# Patient Record
Sex: MALE | Race: WHITE | NOT HISPANIC OR LATINO | Employment: FULL TIME | URBAN - NONMETROPOLITAN AREA
[De-identification: names, ages, dates, MRNs, and addresses within clinical notes are randomized per-mention and may not be internally consistent; named-entity substitution may affect disease eponyms.]

---

## 2017-01-06 ENCOUNTER — OFFICE VISIT (OUTPATIENT)
Dept: INTERNAL MEDICINE | Facility: CLINIC | Age: 57
End: 2017-01-06

## 2017-01-06 VITALS
OXYGEN SATURATION: 97 % | SYSTOLIC BLOOD PRESSURE: 130 MMHG | HEIGHT: 69 IN | HEART RATE: 84 BPM | DIASTOLIC BLOOD PRESSURE: 80 MMHG | WEIGHT: 275 LBS | BODY MASS INDEX: 40.73 KG/M2

## 2017-01-06 DIAGNOSIS — E55.9 VITAMIN D DEFICIENCY: Primary | ICD-10-CM

## 2017-01-06 PROCEDURE — 99212 OFFICE O/P EST SF 10 MIN: CPT | Performed by: FAMILY MEDICINE

## 2017-01-06 RX ORDER — ERGOCALCIFEROL 1.25 MG/1
50000 CAPSULE ORAL WEEKLY
Qty: 12 CAPSULE | Refills: 3 | Status: SHIPPED | OUTPATIENT
Start: 2017-01-06 | End: 2021-08-13

## 2017-01-06 NOTE — PROGRESS NOTES
"Subjective   London Ag is a 56 y.o. male.     History of Present Illness   London presents today to go over labs that were done last month. There are no issues that I see at this time.  VSS. NAD.  Hist AST was a bit high at 45. We will watch that for now. Likely fatty liver.  Hepatitis panel was negative.  His vitamin D levels were low at 24.      The following portions of the patient's history were reviewed and updated as appropriate: allergies, current medications, past social history and problem list.    Review of Systems   Constitutional: Negative for appetite change, chills, fatigue, fever and unexpected weight change.   HENT: Negative for congestion, ear pain, hearing loss, nosebleeds, postnasal drip, rhinorrhea, sore throat, tinnitus and trouble swallowing.    Eyes: Negative for photophobia, discharge and visual disturbance.   Respiratory: Negative for cough, chest tightness, shortness of breath and wheezing.    Cardiovascular: Negative for chest pain, palpitations and leg swelling.   Gastrointestinal: Negative for abdominal distention, abdominal pain, blood in stool, constipation, diarrhea, nausea and vomiting.   Endocrine: Negative for cold intolerance, heat intolerance, polydipsia, polyphagia and polyuria.   Musculoskeletal: Negative for arthralgias, back pain, joint swelling, myalgias, neck pain and neck stiffness.   Skin: Negative for color change, pallor, rash and wound.   Allergic/Immunologic: Negative for environmental allergies, food allergies and immunocompromised state.   Neurological: Negative for dizziness, tremors, seizures, weakness, numbness and headaches.   Hematological: Negative for adenopathy. Does not bruise/bleed easily.   Psychiatric/Behavioral: Negative for agitation, behavioral problems, confusion, hallucinations, self-injury and suicidal ideas. The patient is not nervous/anxious.        Objective   Visit Vitals   • /80   • Pulse 84   • Ht 69\" (175.3 cm)   • Wt 275 lb " (125 kg)   • SpO2 97%   • BMI 40.61 kg/m2     Physical Exam   Constitutional: He is oriented to person, place, and time. He appears well-developed and well-nourished. No distress.   HENT:   Head: Normocephalic and atraumatic.   Right Ear: External ear normal.   Left Ear: External ear normal.   Nose: Nose normal.   Mouth/Throat: Oropharynx is clear and moist.   Eyes: Conjunctivae and EOM are normal. Pupils are equal, round, and reactive to light. Right eye exhibits no discharge. Left eye exhibits no discharge. No scleral icterus.   Neck: Normal range of motion. Neck supple. No JVD present. No tracheal deviation present. No thyromegaly present.   Cardiovascular: Normal rate, regular rhythm, normal heart sounds and intact distal pulses.  Exam reveals no gallop and no friction rub.    No murmur heard.  Pulmonary/Chest: Effort normal and breath sounds normal. No stridor. No respiratory distress. He has no wheezes. He has no rales. He exhibits no tenderness.   Abdominal: Soft. Bowel sounds are normal. He exhibits no distension and no mass. There is no tenderness. There is no rebound and no guarding. No hernia.   Musculoskeletal: Normal range of motion. He exhibits no edema, tenderness or deformity.   Lymphadenopathy:     He has no cervical adenopathy.   Neurological: He is alert and oriented to person, place, and time. He has normal reflexes. He displays normal reflexes. No cranial nerve deficit. He exhibits normal muscle tone.   Skin: Skin is warm and dry. No rash noted. No erythema. No pallor.   Psychiatric: He has a normal mood and affect. His behavior is normal. Judgment normal.       Assessment/Plan   Problem List Items Addressed This Visit        Digestive    Vitamin D deficiency - Primary    Relevant Medications    vitamin D (ERGOCALCIFEROL) 97032 UNITS capsule capsule

## 2017-01-06 NOTE — MR AVS SNAPSHOT
London Ag   1/6/2017 10:00 AM   Office Visit    Provider:  Osmin Day DO   Department:  [facility] PRIMARY CARE   Dept Phone:  [phone]                Your Full Care Plan              Today's Medication Changes          These changes are accurate as of: 1/6/17 10:43 AM.  If you have any questions, ask your nurse or doctor.               New Medication(s)Ordered:     vitamin D 27674 UNITS capsule capsule   Commonly known as:  ERGOCALCIFEROL   Take 1 capsule by mouth 1 (One) Time Per Week.   Started by:  Osmin Day DO            Where to Get Your Medications      These medications were sent to [id number] - [place], [place] - [address] [place] [address] [phone]  - [phone] [address] [address]     Phone:  [phone]     vitamin D 21215 UNITS capsule capsule                  Your Updated Medication List          This list is accurate as of: 1/6/17 10:43 AM.  Always use your most recent med list.                vitamin D 31026 UNITS capsule capsule   Commonly known as:  ERGOCALCIFEROL   Take 1 capsule by mouth 1 (One) Time Per Week.               You Were Diagnosed With        Codes Comments    Vitamin D deficiency    -  Primary ICD-10-CM: E55.9  ICD-9-CM: 268.9       Instructions     None    Patient Instructions History      CellARidehart Signup     Our records indicate that you have an active [employer] account.    You can view your After Visit Summary by going to [employer] and logging in with your Jamgo username and password.  If you don't have a Jamgo username and password but a parent or guardian has access to your record, the parent or guardian should login with their own Jamgo username and password and access your record to view the After Visit Summary.    If you have questions, you can email [email or web address] or call [phone] to talk to our Jamgo staff.  Remember, Jamgo is NOT to  "be used for urgent needs.  For medical emergencies, dial 911.               Other Info from Your Visit           Your Appointments     Jul 07, 2017  9:30 AM EDT   Follow Up with Osmin Day DO   [facility] PRIMARY CARE (--)    [address] [place] [address] [address]  [facility] [address] [phone]           Arrive 15 minutes prior to appointment.              Allergies     No Known Allergies      Reason for Visit     Labs Only FOLLOW UP ON ANY ABNORMAL LABS      Vital Signs     Blood Pressure Pulse Height Weight Oxygen Saturation Body Mass Index    130/80 84 69\" (175.3 cm) 275 lb (125 kg) 97% 40.61 kg/m2    Smoking Status                   Former Smoker           Problems and Diagnoses Noted     Vitamin D deficiency      "

## 2021-08-13 ENCOUNTER — OFFICE VISIT (OUTPATIENT)
Dept: INTERNAL MEDICINE | Facility: CLINIC | Age: 61
End: 2021-08-13

## 2021-08-13 VITALS
SYSTOLIC BLOOD PRESSURE: 140 MMHG | WEIGHT: 273.4 LBS | HEART RATE: 90 BPM | OXYGEN SATURATION: 98 % | HEIGHT: 68 IN | DIASTOLIC BLOOD PRESSURE: 86 MMHG | TEMPERATURE: 97.1 F | BODY MASS INDEX: 41.43 KG/M2

## 2021-08-13 DIAGNOSIS — Z76.89 ENCOUNTER TO ESTABLISH CARE: Primary | ICD-10-CM

## 2021-08-13 DIAGNOSIS — E66.01 MORBID OBESITY (HCC): ICD-10-CM

## 2021-08-13 DIAGNOSIS — Z13.0 SCREENING FOR DISORDER OF BLOOD AND BLOOD-FORMING ORGANS: ICD-10-CM

## 2021-08-13 DIAGNOSIS — R03.0 ELEVATED BLOOD PRESSURE READING: ICD-10-CM

## 2021-08-13 DIAGNOSIS — Z13.0 SCREENING FOR ENDOCRINE, METABOLIC AND IMMUNITY DISORDER: ICD-10-CM

## 2021-08-13 DIAGNOSIS — E55.9 VITAMIN D DEFICIENCY: ICD-10-CM

## 2021-08-13 DIAGNOSIS — Z13.228 SCREENING FOR ENDOCRINE, METABOLIC AND IMMUNITY DISORDER: ICD-10-CM

## 2021-08-13 DIAGNOSIS — Z13.29 SCREENING FOR ENDOCRINE, METABOLIC AND IMMUNITY DISORDER: ICD-10-CM

## 2021-08-13 DIAGNOSIS — Z13.220 SCREENING FOR LIPID DISORDERS: ICD-10-CM

## 2021-08-13 PROCEDURE — 99203 OFFICE O/P NEW LOW 30 MIN: CPT | Performed by: NURSE PRACTITIONER

## 2021-08-13 NOTE — PROGRESS NOTES
"Date: 2021    Name: London Ag  : 1960    Chief Complaint:   Chief Complaint   Patient presents with   • [facility]     HTN       HPI:  London Ag is a 61 y.o. male presents to [facility].      He has been worked up for HTN in the past, when he retired from the [employer] in .  Was elevated at the dentist, recalls diastolic pressure was 116.  Monitors his own blood pressure at home. Runs 120's/80-90.  Has never taken medication for high blood pressure.  Denies chest pain, dyspnea, orthopnea, palpitations, lower extremity edema, confusion, headaches, weakness, visual disturbances.  He has purposefully lost 15 pounds in the past month. Eating smaller, healthier meals.  More salads.  Is sedentary.    Has h/o vit d deficiency, prescribed supplements in the past.       History:    Past Medical History:   Diagnosis Date   • Diverticulosis        History reviewed. No pertinent surgical history.    Family History   Problem Relation Age of Onset   • Cancer Mother         breast   • Stroke Maternal Grandfather        Social History     Socioeconomic History   • Marital status:      Spouse name: Not on file   • Number of children: Not on file   • Years of education: Not on file   • Highest education level: Not on file   Tobacco Use   • Smoking status: Former Smoker     Packs/day: 0.50     Quit date:      Years since quittin.6   • Smokeless tobacco: Never Used   Substance and Sexual Activity   • Alcohol use: Yes     Comment: MAYBE ONCE A MONTH    • Drug use: No   • Sexual activity: Yes     Partners: Female       No Known Allergies    No current outpatient medications on file.    VS:  Vitals:    21 1355   BP: 140/86   Pulse: 90   Temp: 97.1 °F (36.2 °C)   TempSrc: Infrared   SpO2: 98%   Weight: 124 kg (273 lb 6.4 oz)   Height: 172.7 cm (68\")     Body mass index is 41.57 kg/m².    PE:  Physical Exam  Constitutional:       Appearance: He is morbidly obese. He is not ill-appearing. "   HENT:      Head: Normocephalic.      Right Ear: External ear normal.      Left Ear: External ear normal.   Eyes:      Extraocular Movements: Extraocular movements intact.      Conjunctiva/sclera: Conjunctivae normal.      Pupils: Pupils are equal, round, and reactive to light.   Cardiovascular:      Rate and Rhythm: Normal rate and regular rhythm.      Heart sounds: Normal heart sounds.   Pulmonary:      Effort: Pulmonary effort is normal.      Breath sounds: Normal breath sounds.   Musculoskeletal:      Cervical back: Normal range of motion and neck supple.   Skin:     General: Skin is warm.      Capillary Refill: Capillary refill takes less than 2 seconds.   Neurological:      Mental Status: He is alert and oriented to person, place, and time.      Coordination: Coordination normal.      Gait: Gait normal.      Comments: CN II-XII normal. Bilateral , pulls, pushes equal   Psychiatric:         Attention and Perception: Attention normal.         Mood and Affect: Mood and affect normal.         Speech: Speech normal.         Behavior: Behavior normal.         Assessment/Plan:       Diagnoses and all orders for this visit:    1. Encounter to establish care (Primary)    2. Elevated blood pressure reading  -     Comprehensive Metabolic Panel        - Follow heart healthy diet.  Advised to reduce daily sodium intake to < 1500 mg per day.  Avoid processed & fast foods.        - Monitor blood pressure as discussed, keep log and bring to next appointment.          - Exercise as tolerated, with a goal of 30-45 minutes of moderate exercise most days.     3. Screening for disorder of blood and blood-forming organs  -     CBC & Differential    4. Screening for endocrine, metabolic and immunity disorder  -     Comprehensive Metabolic Panel    5. Screening for lipid disorders  -     Lipid Panel    6. Vitamin D deficiency  -     Vitamin D 25 Hydroxy    7. Morbid obesity (CMS/HCC)        - Continue healthy eating, smaller  meals.  Will increase physical activity with goal of 45 minutes of moderate exercise most days of the week.     Return in 4 weeks (on 9/10/2021) for Next scheduled follow up.

## 2021-08-21 LAB
25(OH)D3+25(OH)D2 SERPL-MCNC: 28.3 NG/ML (ref 30–100)
ALBUMIN SERPL-MCNC: 4.5 G/DL (ref 3.5–5.2)
ALBUMIN/GLOB SERPL: 1.7 G/DL
ALP SERPL-CCNC: 57 U/L (ref 39–117)
ALT SERPL-CCNC: 40 U/L (ref 1–41)
AST SERPL-CCNC: 20 U/L (ref 1–40)
BASOPHILS # BLD AUTO: 0.06 10*3/MM3 (ref 0–0.2)
BASOPHILS NFR BLD AUTO: 1 % (ref 0–1.5)
BILIRUB SERPL-MCNC: 0.5 MG/DL (ref 0–1.2)
BUN SERPL-MCNC: 15 MG/DL (ref 6–20)
BUN/CREAT SERPL: 17.6 (ref 7–25)
CALCIUM SERPL-MCNC: 9.5 MG/DL (ref 8.6–10.5)
CHLORIDE SERPL-SCNC: 102 MMOL/L (ref 98–107)
CHOLEST SERPL-MCNC: 161 MG/DL (ref 0–200)
CO2 SERPL-SCNC: 29.6 MMOL/L (ref 22–29)
CREAT SERPL-MCNC: 0.85 MG/DL (ref 0.76–1.27)
EOSINOPHIL # BLD AUTO: 0.17 10*3/MM3 (ref 0–0.4)
EOSINOPHIL NFR BLD AUTO: 2.9 % (ref 0.3–6.2)
ERYTHROCYTE [DISTWIDTH] IN BLOOD BY AUTOMATED COUNT: 13 % (ref 12.3–15.4)
GLOBULIN SER CALC-MCNC: 2.7 GM/DL
GLUCOSE SERPL-MCNC: 102 MG/DL (ref 65–99)
HCT VFR BLD AUTO: 45.5 % (ref 37.5–51)
HDLC SERPL-MCNC: 43 MG/DL (ref 40–60)
HGB BLD-MCNC: 15.6 G/DL (ref 13–17.7)
IMM GRANULOCYTES # BLD AUTO: 0.01 10*3/MM3 (ref 0–0.05)
IMM GRANULOCYTES NFR BLD AUTO: 0.2 % (ref 0–0.5)
LDLC SERPL CALC-MCNC: 103 MG/DL (ref 0–100)
LYMPHOCYTES # BLD AUTO: 1.69 10*3/MM3 (ref 0.7–3.1)
LYMPHOCYTES NFR BLD AUTO: 28.7 % (ref 19.6–45.3)
MCH RBC QN AUTO: 32 PG (ref 26.6–33)
MCHC RBC AUTO-ENTMCNC: 34.3 G/DL (ref 31.5–35.7)
MCV RBC AUTO: 93.4 FL (ref 79–97)
MONOCYTES # BLD AUTO: 0.53 10*3/MM3 (ref 0.1–0.9)
MONOCYTES NFR BLD AUTO: 9 % (ref 5–12)
NEUTROPHILS # BLD AUTO: 3.43 10*3/MM3 (ref 1.7–7)
NEUTROPHILS NFR BLD AUTO: 58.2 % (ref 42.7–76)
NRBC BLD AUTO-RTO: 0 /100 WBC (ref 0–0.2)
PLATELET # BLD AUTO: 226 10*3/MM3 (ref 140–450)
POTASSIUM SERPL-SCNC: 4.3 MMOL/L (ref 3.5–5.2)
PROT SERPL-MCNC: 7.2 G/DL (ref 6–8.5)
RBC # BLD AUTO: 4.87 10*6/MM3 (ref 4.14–5.8)
SODIUM SERPL-SCNC: 141 MMOL/L (ref 136–145)
TRIGL SERPL-MCNC: 80 MG/DL (ref 0–150)
VLDLC SERPL CALC-MCNC: 15 MG/DL (ref 5–40)
WBC # BLD AUTO: 5.89 10*3/MM3 (ref 3.4–10.8)

## 2021-08-25 ENCOUNTER — TELEPHONE (OUTPATIENT)
Dept: INTERNAL MEDICINE | Facility: CLINIC | Age: 61
End: 2021-08-25

## 2021-09-17 ENCOUNTER — OFFICE VISIT (OUTPATIENT)
Dept: INTERNAL MEDICINE | Facility: CLINIC | Age: 61
End: 2021-09-17

## 2021-09-17 VITALS
HEIGHT: 68 IN | SYSTOLIC BLOOD PRESSURE: 142 MMHG | BODY MASS INDEX: 36.37 KG/M2 | TEMPERATURE: 97.5 F | OXYGEN SATURATION: 96 % | HEART RATE: 96 BPM | WEIGHT: 240 LBS | DIASTOLIC BLOOD PRESSURE: 84 MMHG

## 2021-09-17 DIAGNOSIS — R06.83 SNORING: ICD-10-CM

## 2021-09-17 DIAGNOSIS — E55.9 VITAMIN D DEFICIENCY: ICD-10-CM

## 2021-09-17 DIAGNOSIS — R06.81 WITNESSED EPISODE OF APNEA: ICD-10-CM

## 2021-09-17 DIAGNOSIS — I10 ESSENTIAL HYPERTENSION: Primary | ICD-10-CM

## 2021-09-17 PROCEDURE — 99214 OFFICE O/P EST MOD 30 MIN: CPT | Performed by: NURSE PRACTITIONER

## 2021-09-17 RX ORDER — LISINOPRIL 10 MG/1
10 TABLET ORAL DAILY
Qty: 30 TABLET | Refills: 1 | Status: SHIPPED | OUTPATIENT
Start: 2021-09-17 | End: 2021-11-05

## 2021-09-17 NOTE — PROGRESS NOTES
"     Follow Up Office Visit      Patient Name: London Ag  : 1960   MRN: [id number]     Chief Complaint:    Chief Complaint   Patient presents with   • Abstract     f/u on labs       History of Present Illness: London Ag is a 61 y.o. male who is here today for follow up of HTN.  He has been eating less sodium. Monitors BP at home intermittently, readings are consistent. Walks during lunch and breaks at work. Denies chest pain, dyspnea, orthopnea, palpitations, lower extremity edema, confusion, headaches, weakness, visual disturbances.    Snores, wife has awakened due to his apnea, occasionally wakes up unrefreshed.  Sometimes feels drowsy during the day.  Every now and then feels drowsy watching tv, reading.  Was set up for sleep apnea evaluation in the past.      Vit D slightly low when checked last month.  Has been taking a vit d supplement.      Subjective      I have reviewed and the following portions of the patient's history were updated as appropriate: past family history, past medical history, past social history, past surgical history and problem list.      Current Outpatient Medications:   •  lisinopril (PRINIVIL,ZESTRIL) 10 MG tablet, Take 1 tablet by mouth Daily., Disp: 30 tablet, Rfl: 1    No Known Allergies    Objective     Physical Exam:  Vital Signs:   Vitals:    21 0746   BP: 142/84   Pulse: 96   Temp: 97.5 °F (36.4 °C)   TempSrc: Infrared   SpO2: 96%   Weight: 109 kg (240 lb)   Height: 172.7 cm (68\")     Body mass index is 36.49 kg/m².    Physical Exam  Constitutional:       Appearance: He is not ill-appearing.   HENT:      Head: Normocephalic.      Right Ear: External ear normal.      Left Ear: External ear normal.   Eyes:      Conjunctiva/sclera: Conjunctivae normal.      Pupils: Pupils are equal, round, and reactive to light.   Cardiovascular:      Rate and Rhythm: Normal rate and regular rhythm.      Heart sounds: Normal heart sounds.   Pulmonary:      Effort: Pulmonary " effort is normal.      Breath sounds: Normal breath sounds.   Musculoskeletal:      Cervical back: Normal range of motion and neck supple.   Skin:     General: Skin is warm.      Capillary Refill: Capillary refill takes less than 2 seconds.   Neurological:      Mental Status: He is alert and oriented to person, place, and time.      Coordination: Coordination normal.      Gait: Gait normal.   Psychiatric:         Attention and Perception: Attention normal.         Mood and Affect: Mood and affect normal.         Speech: Speech normal.         Behavior: Behavior normal.       Assessment / Plan      Assessment/Plan:   Diagnoses and all orders for this visit:    1. Essential hypertension (Primary)  -     lisinopril (PRINIVIL,ZESTRIL) 10 MG tablet; Take 1 tablet by mouth Daily.  Dispense: 30 tablet; Refill: 1        - Follow heart healthy diet.  Continue to keep daily sodium intake to < 1500 mg per day.  Avoid processed & fast foods.        - Monitor blood pressure as discussed, keep log and bring to next appointment.          - Exercise as tolerated, with a goal of 30 minutes of moderate exercise most days.     2. Vitamin D deficiency        - Take vitamin D supplement with calcium to enhance absorption        - 30 minutes a day in the sun    3. Snoring        - Ambulatory referral to sleep medicine    4. Witnessed episode of apnea        - Ambulatory referral to sleep medicine      Follow Up:   Return in about 4 weeks (around 10/15/2021) for Next scheduled follow up.    Patient was given instructions and counseling regarding his condition or for health maintenance advice. Please see specific information pulled into the AVS if appropriate.       Primary [facility] [address] [facility]     Please note that portions of this note may have been completed with a voice recognition program. Efforts were made to edit dictation, but occasionally words are mistranscribed.

## 2021-09-27 ENCOUNTER — OFFICE VISIT (OUTPATIENT)
Dept: SLEEP MEDICINE | Facility: CLINIC | Age: 61
End: 2021-09-27

## 2021-09-27 VITALS
BODY MASS INDEX: 36.37 KG/M2 | DIASTOLIC BLOOD PRESSURE: 99 MMHG | WEIGHT: 240 LBS | OXYGEN SATURATION: 96 % | HEART RATE: 81 BPM | HEIGHT: 68 IN | SYSTOLIC BLOOD PRESSURE: 167 MMHG

## 2021-09-27 DIAGNOSIS — E66.09 CLASS 2 OBESITY DUE TO EXCESS CALORIES WITHOUT SERIOUS COMORBIDITY WITH BODY MASS INDEX (BMI) OF 36.0 TO 36.9 IN ADULT: ICD-10-CM

## 2021-09-27 DIAGNOSIS — R06.83 SNORING: Primary | ICD-10-CM

## 2021-09-27 DIAGNOSIS — G47.33 OBSTRUCTIVE SLEEP APNEA, ADULT: ICD-10-CM

## 2021-09-27 PROCEDURE — 99203 OFFICE O/P NEW LOW 30 MIN: CPT | Performed by: INTERNAL MEDICINE

## 2021-09-29 NOTE — PROGRESS NOTES
"Subjective   London Ag is a 61 y.o. male is being seen for consultation today at the request of STEPHANIE Arroyo for the evaluation of snoring and nonrestorative sleep.  He is seen in person in the sleep clinic.    History of Present Illness  Patient states he had snoring noted \"all my life\" these also have occasional apneas noted recently.  He says he is up frequently at night to go to the bathroom.  He denies awakening gasping.  He says he is usually rested on arising in the morning.  He denies having morning headaches.    He denies being sleepy during the day.  He does occasionally get sleepy driving.  He awakens at night with a dry mouth.  He denies ever breaking his nose or having trouble breathing through his nose but does say that he has nasal allergies.  he has a history of reflux.  he denies kicking or jerking his legs at night.  He does have some back pain but does not keep him awake.    He goes to bed between 9 and 10 PM.  He will fall asleep in 5 minutes.  He awakens twice during the night.  He gets 7 hours of sleep says that he is usually rested.  He has had hypertension noted recently.  He denies any history of diabetes or coronary artery disease.  Allergies   Allergen Reactions   • Bee Pollen Unknown - Low Severity          Current Outpatient Medications:   •  lisinopril (PRINIVIL,ZESTRIL) 10 MG tablet, Take 1 tablet by mouth Daily., Disp: 30 tablet, Rfl: 1    Social History    Tobacco Use      Smoking status: Former Smoker he said he smoked a pack per day for 17 years.        Packs/day: 0.50        Quit date:         Years since quittin.7      Smokeless tobacco: Never Used       Social History     Substance and Sexual Activity   Alcohol Use Yes    Comment: MAYBE ONCE A MONTH        Caffeine: He has 3 cups of coffee per day.  He has 1 serving of tea per month.  He has 3-4 mode per day.    Past Medical History:   Diagnosis Date   • Diverticulosis    • Hypertension        Past " "Surgical History:   Procedure Laterality Date   • WISDOM TOOTH EXTRACTION         Family History   Problem Relation Age of Onset   • Cancer Mother         breast   • Stroke Maternal Grandfather        The following portions of the patient's history were reviewed and updated as appropriate: allergies, current medications, past family history, past medical history, past social history, past surgical history and problem list.    Review of Systems   Constitutional: Negative.    HENT: Negative.    Eyes: Negative.    Respiratory: Positive for apnea.    Cardiovascular: Negative.    Gastrointestinal: Negative.    Endocrine: Negative.    Genitourinary: Negative.    Musculoskeletal: Negative.    Skin: Negative.    Allergic/Immunologic: Negative.    Neurological: Negative.    Hematological: Negative.    Psychiatric/Behavioral: Negative.    [place] score is 9/24    Objective     /99 (BP Location: Left arm, Patient Position: Sitting, Cuff Size: Adult)   Pulse 81   Ht 172.7 cm (67.99\")   Wt 109 kg (240 lb)   SpO2 96%   BMI 36.50 kg/m²      Physical Exam  Patient appears to be awake and alert.  He is not appear to be in acute respiratory distress.    He is normocephalic.  He has Mallampati class IV anatomy.    Lungs are clear.    Cardiac exam feels normal S1-S2.    Extremities showed no edema.      Assessment/Plan   Diagnoses and all orders for this visit:    1. Snoring (Primary)  -     Home Sleep Study; Future    2. Obstructive sleep apnea, adult  -     Home Sleep Study; Future    3. Class 2 obesity due to excess calories without serious comorbidity with body mass index (BMI) of 36.0 to 36.9 in adult    Patient gives a history of snoring and observed apneas.  Patient excellent story for obstructive sleep apnea.  He wishes to proceed to home sleep testing rather than an in lab study.  We have discussed possible therapies including CPAP, weight control, oral appliance, and surgery.  We have discussed the long-term " consequences of untreated obstructive sleep apnea.  These include hypertension, diabetes, heart disease, stroke, and dementia.  He is encouraged to lose weight.  He is encouraged to avoid alcohol and sedatives close to bedtime.  He is encouraged to practice lateral position sleep.  We will see him back after his study.    Total time: 35 minutes exclusive of procedures.         Anderson Mejía MD [facility]  Sleep Medicine  Pulmonary and Critical Care Medicine

## 2021-11-05 ENCOUNTER — OFFICE VISIT (OUTPATIENT)
Dept: INTERNAL MEDICINE | Facility: CLINIC | Age: 61
End: 2021-11-05

## 2021-11-05 VITALS
DIASTOLIC BLOOD PRESSURE: 98 MMHG | TEMPERATURE: 97.8 F | WEIGHT: 272 LBS | HEIGHT: 68 IN | OXYGEN SATURATION: 96 % | HEART RATE: 104 BPM | SYSTOLIC BLOOD PRESSURE: 142 MMHG | BODY MASS INDEX: 41.22 KG/M2

## 2021-11-05 DIAGNOSIS — I10 PRIMARY HYPERTENSION: Primary | ICD-10-CM

## 2021-11-05 PROCEDURE — 99213 OFFICE O/P EST LOW 20 MIN: CPT | Performed by: NURSE PRACTITIONER

## 2021-11-05 RX ORDER — AMLODIPINE BESYLATE 10 MG/1
TABLET ORAL
Qty: 25 TABLET | Refills: 0 | Status: SHIPPED | OUTPATIENT
Start: 2021-11-05 | End: 2021-11-30

## 2021-11-05 NOTE — PROGRESS NOTES
"     Follow Up Office Visit      Patient Name: London Ag  : 1960   MRN: [id number]     Chief Complaint:    Chief Complaint   Patient presents with   • Follow-up     hypertension       History of Present Illness: London Ag is a 61 y.o. male who is here today for follow up of hypertension.  Has been taking lisinopril 10 mg since prescribed on 2021.  Monitors blood pressure at home, states readings initially improved with lisinopril.  After the first 2 weeks, did not make much difference in blood pressure readings.  Does not eat a heart healthy diet, is sedentary. Denies chest pain, dyspnea, orthopnea, palpitations, lower extremity edema, confusion, headaches, weakness, visual disturbances.      Subjective      I have reviewed and the following portions of the patient's history were updated as appropriate: past family history, past medical history, past social history, past surgical history and problem list.      Current Outpatient Medications:   •  amLODIPine (NORVASC) 10 MG tablet, Take 0.5 tablets by mouth Daily for 7 days, THEN 1 tablet Daily for 21 days., Disp: 25 tablet, Rfl: 0    Allergies   Allergen Reactions   • Bee Pollen Unknown - Low Severity       Objective     Physical Exam:  Vital Signs:   Vitals:    21 0747   BP: 142/98   Pulse: 104   Temp: 97.8 °F (36.6 °C)   SpO2: 96%   Weight: 123 kg (272 lb)   Height: 172.7 cm (67.99\")     Body mass index is 41.37 kg/m².    Physical Exam  Constitutional:       Appearance: He is not ill-appearing.   HENT:      Head: Normocephalic.      Right Ear: External ear normal.      Left Ear: External ear normal.   Eyes:      Conjunctiva/sclera: Conjunctivae normal.      Pupils: Pupils are equal, round, and reactive to light.   Cardiovascular:      Rate and Rhythm: Regular rhythm. Tachycardia present.      Heart sounds: Normal heart sounds.   Pulmonary:      Effort: Pulmonary effort is normal.      Breath sounds: Normal breath sounds. "   Musculoskeletal:      Cervical back: Normal range of motion and neck supple.   Skin:     General: Skin is warm.      Capillary Refill: Capillary refill takes less than 2 seconds.   Neurological:      Mental Status: He is alert and oriented to person, place, and time.      Coordination: Coordination normal.      Gait: Gait normal.   Psychiatric:         Attention and Perception: Attention normal.         Mood and Affect: Mood and affect normal.         Speech: Speech normal.         Behavior: Behavior normal.       Assessment / Plan      Assessment/Plan:   Diagnoses and all orders for this visit:    1. Primary hypertension (Primary)  -     amLODIPine (NORVASC) 10 MG tablet; Take 0.5 tablets by mouth Daily for 7 days, THEN 1 tablet Daily for 21 days.  Dispense: 25 tablet; Refill: 0  - Lisinopril discontinued, as it does not seem to be efficacious        - Follow heart healthy diet.  Advised to reduce daily sodium intake to < 1500 mg per day.  Avoid processed & fast foods.        - Monitor blood pressure as discussed, keep log and bring to next appointment. Will send popexpert message if BP continues to be elevated with change in medication.          - Exercise as tolerated, with a goal of 30 minutes of moderate exercise most days.         - Take medications as prescribed.    Follow Up:   Return in about 2 months (around 1/5/2022) for Annual.    Patient was given instructions and counseling regarding his condition or for health maintenance advice. Please see specific information pulled into the AVS if appropriate.       Primary [facility] [address] [facility]     Please note that portions of this note may have been completed with a voice recognition program. Efforts were made to edit dictation, but occasionally words are mistranscribed.

## 2021-11-14 DIAGNOSIS — I10 ESSENTIAL HYPERTENSION: ICD-10-CM

## 2021-11-15 RX ORDER — LISINOPRIL 10 MG/1
10 TABLET ORAL DAILY
Qty: 90 TABLET | OUTPATIENT
Start: 2021-11-15

## 2021-11-30 DIAGNOSIS — I10 PRIMARY HYPERTENSION: ICD-10-CM

## 2021-11-30 RX ORDER — AMLODIPINE BESYLATE 10 MG/1
10 TABLET ORAL DAILY
Qty: 90 TABLET | Refills: 3 | Status: SHIPPED | OUTPATIENT
Start: 2021-11-30 | End: 2022-11-04 | Stop reason: SDUPTHER

## 2021-12-08 ENCOUNTER — HOSPITAL ENCOUNTER (OUTPATIENT)
Dept: SLEEP MEDICINE | Facility: HOSPITAL | Age: 61
Discharge: HOME | End: 2021-12-08
Admitting: INTERNAL MEDICINE

## 2021-12-08 DIAGNOSIS — R06.83 SNORING: ICD-10-CM

## 2021-12-08 DIAGNOSIS — G47.33 OBSTRUCTIVE SLEEP APNEA, ADULT: ICD-10-CM

## 2021-12-08 PROCEDURE — 95806 SLEEP STUDY UNATT&RESP EFFT: CPT | Performed by: INTERNAL MEDICINE

## 2021-12-08 PROCEDURE — 95806 SLEEP STUDY UNATT&RESP EFFT: CPT

## 2021-12-17 ENCOUNTER — OFFICE VISIT (OUTPATIENT)
Dept: SLEEP MEDICINE | Facility: CLINIC | Age: 61
End: 2021-12-17

## 2021-12-17 VITALS
HEART RATE: 101 BPM | HEIGHT: 68 IN | BODY MASS INDEX: 42.59 KG/M2 | DIASTOLIC BLOOD PRESSURE: 85 MMHG | SYSTOLIC BLOOD PRESSURE: 151 MMHG | OXYGEN SATURATION: 96 % | WEIGHT: 281 LBS

## 2021-12-17 DIAGNOSIS — E66.01 MORBID (SEVERE) OBESITY DUE TO EXCESS CALORIES: ICD-10-CM

## 2021-12-17 DIAGNOSIS — G47.33 OBSTRUCTIVE SLEEP APNEA, ADULT: Primary | ICD-10-CM

## 2021-12-17 PROCEDURE — 99213 OFFICE O/P EST LOW 20 MIN: CPT | Performed by: INTERNAL MEDICINE

## 2021-12-21 ENCOUNTER — PATIENT MESSAGE (OUTPATIENT)
Dept: INTERNAL MEDICINE | Facility: CLINIC | Age: 61
End: 2021-12-21

## 2021-12-21 DIAGNOSIS — Z12.11 SCREEN FOR COLON CANCER: Primary | ICD-10-CM

## 2021-12-21 NOTE — TELEPHONE ENCOUNTER
Michelle Carrera MA 12/21/2021 9:39 AM EST      ----- Message -----  From: London Ag  Sent: 12/21/2021 9:31 AM EST  To: Indu Ramirez [facility]  Subject: Cologuard     I checked on the  website, and they do cover Cologuard screening, so I'd like to go that route. Do I need to set an appointment to get that process started?

## 2022-01-03 NOTE — PROGRESS NOTES
"Chief Complaint  Snoring and nonrestorative sleep.    Subjective         London Ag presents to [facility] for evaluation of snoring nonrestorative sleep.  His primary care provider is SPENCER Dhaliwal.  He is seen in person in sleep clinic.  History of Present Illness  Patient was last in clinic September 27.  He has snoring nonrestorative sleep as well as hypertension and obesity.  He had a sleep study December 8 and returns for results.  He says his sleeping has been worse overall.  He is awakening more with a dry mouth.  He says he awakens at every couple of hours at night.  He denies any overall changes in his health status.  He felt his sleep was worse on the night of the study because the device bothered him.    Review of Systems   Constitutional: Negative.    HENT: Negative.    Eyes: Negative.    Respiratory: Positive for apnea.    Cardiovascular: Negative.    Gastrointestinal: Negative.    Endocrine: Negative.    Genitourinary: Negative.    Musculoskeletal: Negative.    Skin: Negative.    Allergic/Immunologic: Negative.    Neurological: Negative.    Hematological: Negative.    Psychiatric/Behavioral: Negative.      Fisher score is 11/24  Objective   Vital Signs:   /85 (BP Location: Left arm, Patient Position: Sitting, Cuff Size: Adult)   Pulse 101   Ht 172.7 cm (68\")   Wt 127 kg (281 lb)   SpO2 96%   BMI 42.73 kg/m²     Physical Exam patient peers to be awake and alert.  He does not appear to be in acute respiratory distress.    He is normocephalic.    Lungs are clear.    Cardiac exam revealed normal S1-S2.    Extremities showed no edema.  Result Review :    Patient's home sleep testing on December 8 showed an AHI of 73.  He did not have supine sleep.  He spent 236.5 minutes in the desaturated state  Assessment and Plan   Diagnoses and all orders for this visit:    1. Obstructive sleep apnea, adult (Primary)  -     Detailed AutoPAP Order    2. Morbid " (severe) obesity due to excess calories (HCC)    Patient does have severe obstructive sleep apnea.  We have again discussed therapies including CPAP, weight control, oral appliance, and surgery.  We have discussed the long-term consequences of untreated obstructive sleep apnea.  These include hypertension, diabetes, heart disease, stroke, and dementia.  After discussion he wishes to proceed with CPAP.  We will place order for CPAP.  We will plan to see him back in 2 months and we can download the machine make sure it is being effective.  We need to do overnight oximetry also to be certain correcting his nocturnal hypoxemia.  He is encouraged to lose weight.  He is encouraged avoid alcohol and sedatives close to bedtime.  He is encouraged practice lateralization sleep.  I spent 25 minutes caring for London on this date of service. This time includes time spent by me in the following activities:obtaining and/or reviewing a separately obtained history, performing a medically appropriate examination and/or evaluation , counseling and educating the patient/family/caregiver, ordering medications, tests, or procedures and documenting information in the medical record  Follow Up   Return for 31 to 90 days after PAP setup, Next scheduled follow-up.  Patient was given instructions and counseling regarding his condition or for health maintenance advice. Please see specific information pulled into the AVS if appropriate.   Anderson Mejía MD [facility]  Sleep Medicine  Pulmonary and Critical Care Medicine

## 2022-05-06 ENCOUNTER — OFFICE VISIT (OUTPATIENT)
Dept: SLEEP MEDICINE | Facility: CLINIC | Age: 62
End: 2022-05-06

## 2022-05-06 VITALS
WEIGHT: 294 LBS | SYSTOLIC BLOOD PRESSURE: 155 MMHG | HEART RATE: 88 BPM | HEIGHT: 68 IN | OXYGEN SATURATION: 96 % | DIASTOLIC BLOOD PRESSURE: 84 MMHG | BODY MASS INDEX: 44.56 KG/M2

## 2022-05-06 DIAGNOSIS — G47.33 OBSTRUCTIVE SLEEP APNEA, ADULT: Primary | ICD-10-CM

## 2022-05-06 DIAGNOSIS — E66.01 MORBID (SEVERE) OBESITY DUE TO EXCESS CALORIES: ICD-10-CM

## 2022-05-06 PROCEDURE — 99213 OFFICE O/P EST LOW 20 MIN: CPT | Performed by: INTERNAL MEDICINE

## 2022-11-04 ENCOUNTER — OFFICE VISIT (OUTPATIENT)
Dept: INTERNAL MEDICINE | Facility: CLINIC | Age: 62
End: 2022-11-04

## 2022-11-04 VITALS
SYSTOLIC BLOOD PRESSURE: 124 MMHG | DIASTOLIC BLOOD PRESSURE: 72 MMHG | BODY MASS INDEX: 43.5 KG/M2 | HEIGHT: 68 IN | WEIGHT: 287 LBS | HEART RATE: 96 BPM | TEMPERATURE: 97.1 F | OXYGEN SATURATION: 96 %

## 2022-11-04 DIAGNOSIS — G47.33 OSA ON CPAP: ICD-10-CM

## 2022-11-04 DIAGNOSIS — E66.01 MORBID OBESITY: ICD-10-CM

## 2022-11-04 DIAGNOSIS — Z13.220 SCREENING FOR LIPID DISORDERS: ICD-10-CM

## 2022-11-04 DIAGNOSIS — R68.89 ITCHY EYES: ICD-10-CM

## 2022-11-04 DIAGNOSIS — Z00.00 ANNUAL PHYSICAL EXAM: Primary | ICD-10-CM

## 2022-11-04 DIAGNOSIS — E55.9 VITAMIN D DEFICIENCY: ICD-10-CM

## 2022-11-04 DIAGNOSIS — Z13.0 SCREENING FOR DISORDER OF BLOOD AND BLOOD-FORMING ORGANS: ICD-10-CM

## 2022-11-04 DIAGNOSIS — I10 PRIMARY HYPERTENSION: ICD-10-CM

## 2022-11-04 DIAGNOSIS — Z12.5 SCREENING FOR PROSTATE CANCER: ICD-10-CM

## 2022-11-04 DIAGNOSIS — Z99.89 OSA ON CPAP: ICD-10-CM

## 2022-11-04 PROCEDURE — 99396 PREV VISIT EST AGE 40-64: CPT | Performed by: NURSE PRACTITIONER

## 2022-11-04 RX ORDER — OLOPATADINE HYDROCHLORIDE 2 MG/ML
1 SOLUTION/ DROPS OPHTHALMIC DAILY
Qty: 2.5 ML | Refills: 11 | Status: SHIPPED | OUTPATIENT
Start: 2022-11-04 | End: 2022-11-23

## 2022-11-04 RX ORDER — AMLODIPINE BESYLATE 10 MG/1
10 TABLET ORAL DAILY
Qty: 90 TABLET | Refills: 3 | Status: SHIPPED | OUTPATIENT
Start: 2022-11-04

## 2022-11-04 NOTE — PROGRESS NOTES
Chief Complaint   Patient presents with   • Annual Exam   • Hypertension       London Ag is a 62 y.o. male and is here for a comprehensive physical exam.     HTN: takes amlodipine 10 mg daily as prescribed.  Rarely checks BP.  Denies chest pain, dyspnea, orthopnea, palpitations, lower extremity edema, confusion, headaches, weakness, visual disturbances.    GREGORY. Wearing CPAP.      Eyes have been itching without discharge.  Worse when seasonal and environmental allergies cause nasal congestion.      History:  Erectile dysfunction  no  Nocturia  yes, 1-2 x a night.  Less now than it was before he started using CPAP    Do you take any herbs or supplements that were not prescribed by a doctor? Vitamin D, probiotic, calcium and magnesium    Health Habits:  Dental Exam. up to date  Eye Exam. up to date, wears glasses  Exercise: 1 times/week.  Current exercise activities include: walking   Diet: healthy well balanced diet.    Health Maintenance   Topic Date Due   • COLORECTAL CANCER SCREENING  11/18/2022 (Originally 1960)   • INFLUENZA VACCINE  03/31/2023 (Originally 8/1/2022)   • COVID-19 Vaccine (4 - Booster for Moderna series) 11/06/2023 (Originally 7/20/2022)   • ANNUAL PHYSICAL  11/05/2023   • TDAP/TD VACCINES (3 - Td or Tdap) 12/30/2031   • HEPATITIS C SCREENING  Completed   • ZOSTER VACCINE  Completed   • Pneumococcal Vaccine 0-64  Aged Out       PMH, PSH, SocHx, FamHx, Allergies, and Medications: Reviewed and updated in the Visit Navigator.     Allergies   Allergen Reactions   • Bee Pollen Unknown - Low Severity     Past Medical History:   Diagnosis Date   • Allergic 1979   • Diverticulosis    • Hypertension    • Low back pain 1990s    [place] partly due to loss of range of motion of back   • Obesity ~2010     Past Surgical History:   Procedure Laterality Date   • WISDOM TOOTH EXTRACTION       Social History     Socioeconomic History   • Marital status:    Tobacco Use   • Smoking  "status: Former     Packs/day: 1.00     Years: 17.00     Pack years: 17.00     Types: Cigarettes     Start date: 1980     Quit date: 1997     Years since quittin.2   • Smokeless tobacco: Never   Substance and Sexual Activity   • Alcohol use: Yes     Comment: once a week on average   • Drug use: No   • Sexual activity: Not Currently     Partners: Female     Comment: , wife in [place]     Family History   Problem Relation Age of Onset   • Cancer Mother         breast   • Early death Mother         cancer   • Stroke Maternal Grandfather        Review of Systems  Review of Systems   All other systems reviewed and are negative.      Objective   /72   Pulse 96   Temp 97.1 °F (36.2 °C)   Ht 172.7 cm (67.99\")   Wt 130 kg (287 lb)   SpO2 96%   BMI 43.65 kg/m²   Body mass index is 43.65 kg/m².    Physical Exam  Constitutional:       Appearance: He is well-developed. He is morbidly obese. He is not ill-appearing.   HENT:      Head: Normocephalic.      Right Ear: Tympanic membrane, ear canal and external ear normal.      Left Ear: Tympanic membrane, ear canal and external ear normal.      Nose: Nose normal.      Mouth/Throat:      Mouth: Mucous membranes are moist.      Pharynx: Oropharynx is clear. Uvula midline.   Eyes:      Extraocular Movements: Extraocular movements intact.      Conjunctiva/sclera: Conjunctivae normal.      Pupils: Pupils are equal, round, and reactive to light.   Neck:      Thyroid: No thyromegaly.      Vascular: No carotid bruit.   Cardiovascular:      Rate and Rhythm: Normal rate and regular rhythm.      Pulses:           Radial pulses are 2+ on the right side and 2+ on the left side.        Dorsalis pedis pulses are 2+ on the right side and 2+ on the left side.      Heart sounds: Normal heart sounds.   Pulmonary:      Effort: Pulmonary effort is normal.      Breath sounds: Normal breath sounds.   Abdominal:      General: Bowel sounds are normal.      Palpations: Abdomen " is soft.      Tenderness: There is no abdominal tenderness.   Musculoskeletal:         General: No tenderness or deformity. Normal range of motion.      Cervical back: Full passive range of motion without pain, normal range of motion and neck supple.      Right lower leg: No edema.      Left lower leg: No edema.   Lymphadenopathy:      Cervical: No cervical adenopathy.   Skin:     General: Skin is warm.      Capillary Refill: Capillary refill takes less than 2 seconds.   Neurological:      Mental Status: He is alert and oriented to person, place, and time.      Sensory: No sensory deficit.      Coordination: Coordination normal.      Gait: Gait normal.      Comments: CN II-XII normal   Psychiatric:         Attention and Perception: Attention normal.         Mood and Affect: Mood and affect normal.         Speech: Speech normal.         Behavior: Behavior normal.         Thought Content: Thought content normal.     The 10-year ASCVD risk score (Clifford SHANNON, et al., 2019) is: 7.8%    Values used to calculate the score:      Age: 62 years      Sex: Male      Is Non- : No      Diabetic: No      Tobacco smoker: No      Systolic Blood Pressure: 124 mmHg      Is BP treated: Yes      HDL Cholesterol: 53 mg/dL      Total Cholesterol: 169 mg/dL    Assessment & Plan   1. Healthy male exam.    2. Patient Counseling: Including but not Limited to the following, when appropriate:  --Nutrition: Stressed importance of moderation in sodium/caffeine intake, saturated fat and cholesterol, caloric balance, sufficient intake of fresh fruits, vegetables, fiber  --Exercise: Stressed the importance of regular exercise.   --Substance Abuse: Discussed cessation/primary prevention of tobacco, alcohol, or other drug use; driving or other dangerous activities under the influence; availability of treatment for abuse, as indicated based on social history.    --Sexuality: Discussed sexually transmitted diseases, partner  selection, use of condoms and contraceptive alternatives.   --Injury prevention: Discussed safety belts, safety helmets, smoke detector, smoking near bedding or upholstery.   --Dental health: Discussed importance of regular tooth brushing, flossing, and dental visits.  --Immunizations reviewed.  --Discussed benefits of colon cancer screening.  3. Discussed the patient's BMI with him.  The BMI is above average; BMI management plan is completed  4. Return in about 1 year (around 11/4/2023) for Annual.  5. Age-appropriate Screening Scheduled    Assessment & Plan     Diagnoses and all orders for this visit:    1. Annual physical exam (Primary)    2. Primary hypertension  -     amLODIPine (NORVASC) 10 MG tablet; Take 1 tablet by mouth Daily.  Dispense: 90 tablet; Refill: 3  -     Comprehensive Metabolic Panel        - Follow heart healthy diet.  Keep sodium intake < 1500 mg per day.  Avoid processed & fast foods.          - Exercise as tolerated, with a goal of 30 minutes of moderate exercise most days.         - Take medications as prescribed.    3. Itchy eyes  -     olopatadine (PATADAY) 0.2 % solution ophthalmic solution; Administer 1 drop to both eyes Daily.  Dispense: 2.5 mL; Refill: 11    4. Morbid obesity (HCC)        - Class 3 Severe Obesity (BMI >=40). Obesity-related health conditions include the following: obstructive sleep apnea and hypertension. Obesity is unchanged. BMI is is above average; BMI management plan is completed. We discussed low calorie, low carb based diet program, portion control and increasing exercise.    5. Screening for prostate cancer  -     PSA SCREENING    6. Vitamin D deficiency  -     Vitamin D,25-Hydroxy    7. Screening for lipid disorders  -     Lipid Panel    8. Screening for disorder of blood and blood-forming organs  -     CBC & Differential    9. GREGORY on CPAP        - Continue wearing CPAP

## 2022-11-08 LAB
25(OH)D3+25(OH)D2 SERPL-MCNC: 69.4 NG/ML (ref 30–100)
ALBUMIN SERPL-MCNC: 4.7 G/DL (ref 3.5–5.2)
ALBUMIN/GLOB SERPL: 2.1 G/DL
ALP SERPL-CCNC: 63 U/L (ref 39–117)
ALT SERPL-CCNC: 35 U/L (ref 1–41)
AST SERPL-CCNC: 21 U/L (ref 1–40)
BASOPHILS # BLD AUTO: 0.06 10*3/MM3 (ref 0–0.2)
BASOPHILS NFR BLD AUTO: 0.8 % (ref 0–1.5)
BILIRUB SERPL-MCNC: 0.4 MG/DL (ref 0–1.2)
BUN SERPL-MCNC: 12 MG/DL (ref 8–23)
BUN/CREAT SERPL: 11.5 (ref 7–25)
CALCIUM SERPL-MCNC: 9.5 MG/DL (ref 8.6–10.5)
CHLORIDE SERPL-SCNC: 98 MMOL/L (ref 98–107)
CHOLEST SERPL-MCNC: 169 MG/DL (ref 0–200)
CO2 SERPL-SCNC: 31.6 MMOL/L (ref 22–29)
CREAT SERPL-MCNC: 1.04 MG/DL (ref 0.76–1.27)
EGFRCR SERPLBLD CKD-EPI 2021: 82.2 ML/MIN/1.73
EOSINOPHIL # BLD AUTO: 0.26 10*3/MM3 (ref 0–0.4)
EOSINOPHIL NFR BLD AUTO: 3.7 % (ref 0.3–6.2)
ERYTHROCYTE [DISTWIDTH] IN BLOOD BY AUTOMATED COUNT: 13 % (ref 12.3–15.4)
GLOBULIN SER CALC-MCNC: 2.2 GM/DL
GLUCOSE SERPL-MCNC: 93 MG/DL (ref 65–99)
HCT VFR BLD AUTO: 42.6 % (ref 37.5–51)
HDLC SERPL-MCNC: 53 MG/DL (ref 40–60)
HGB BLD-MCNC: 14.9 G/DL (ref 13–17.7)
IMM GRANULOCYTES # BLD AUTO: 0.02 10*3/MM3 (ref 0–0.05)
IMM GRANULOCYTES NFR BLD AUTO: 0.3 % (ref 0–0.5)
LDLC SERPL CALC-MCNC: 99 MG/DL (ref 0–100)
LYMPHOCYTES # BLD AUTO: 1.57 10*3/MM3 (ref 0.7–3.1)
LYMPHOCYTES NFR BLD AUTO: 22.2 % (ref 19.6–45.3)
MCH RBC QN AUTO: 32.5 PG (ref 26.6–33)
MCHC RBC AUTO-ENTMCNC: 35 G/DL (ref 31.5–35.7)
MCV RBC AUTO: 92.8 FL (ref 79–97)
MONOCYTES # BLD AUTO: 0.7 10*3/MM3 (ref 0.1–0.9)
MONOCYTES NFR BLD AUTO: 9.9 % (ref 5–12)
NEUTROPHILS # BLD AUTO: 4.45 10*3/MM3 (ref 1.7–7)
NEUTROPHILS NFR BLD AUTO: 63.1 % (ref 42.7–76)
NRBC BLD AUTO-RTO: 0 /100 WBC (ref 0–0.2)
PLATELET # BLD AUTO: 240 10*3/MM3 (ref 140–450)
POTASSIUM SERPL-SCNC: 4 MMOL/L (ref 3.5–5.2)
PROT SERPL-MCNC: 6.9 G/DL (ref 6–8.5)
PSA SERPL-MCNC: 3.6 NG/ML (ref 0–4)
RBC # BLD AUTO: 4.59 10*6/MM3 (ref 4.14–5.8)
SODIUM SERPL-SCNC: 139 MMOL/L (ref 136–145)
TRIGL SERPL-MCNC: 90 MG/DL (ref 0–150)
VLDLC SERPL CALC-MCNC: 17 MG/DL (ref 5–40)
WBC # BLD AUTO: 7.06 10*3/MM3 (ref 3.4–10.8)

## 2022-11-18 PROBLEM — I10 PRIMARY HYPERTENSION: Status: ACTIVE | Noted: 2022-11-18

## 2022-11-18 PROBLEM — Z99.89 OSA ON CPAP: Status: ACTIVE | Noted: 2022-11-18

## 2022-11-18 PROBLEM — G47.33 OSA ON CPAP: Status: ACTIVE | Noted: 2022-11-18

## 2022-11-23 RX ORDER — OLOPATADINE HYDROCHLORIDE 1 MG/ML
1 SOLUTION/ DROPS OPHTHALMIC 2 TIMES DAILY
Qty: 5 ML | Refills: 11 | Status: SHIPPED | OUTPATIENT
Start: 2022-11-23

## 2022-12-28 ENCOUNTER — TELEPHONE (OUTPATIENT)
Dept: INTERNAL MEDICINE | Facility: CLINIC | Age: 62
End: 2022-12-28

## 2022-12-28 NOTE — TELEPHONE ENCOUNTER
Patient did not complete Xray ordered on 12/21/2021. This order is too old to be used and has been cancelled.

## 2023-05-25 NOTE — PROGRESS NOTES
Sleep Clinic Follow Up Note    Chief Complaint  Sleep Apnea    Subjective     History of Present Illness (from previous encounter on 5/6/2022 with Dr. Mejía):  Patient was last seen in clinic December 17.  He had history of snoring and obstructive sleep apnea.  He was diagnosed with severe obstructive sleep apnea on his previous sleep study.  He has a history of hypertension and obesity.  He got his CPAP machine little over a month ago.  He says he sleeping much better.  He says he is gradually getting more energy and feels better.  He feels like more interested in waking up in the morning.  His blood pressure is also improved.  He denies any other changes in his health status. (End copied text).    Interval History:  London Ag is a 63 y.o. male returns for follow up and compliance of CPAP therapy. The patient was last seen on 5/6/2022 with Dr. Mejía. Overall the patient feels good with regard to therapy. The device appears to be/does not working appropriately. On average the patient sleeps 7 hours per night. The patient wakes 1-2 times per night.     The patient reports the following changes to their medical and medication history since they were last seen:  None    Further details are as follows:      Richmond Scale is (out of 24): Total score: 8     Weight:  Current Weight: 287 lb    Weight change in the last year:  gain: 0 lbs    The patient's relevant past medical, surgical, family, and social history reviewed and updated in Epic as appropriate.    PMH:    Past Medical History:   Diagnosis Date   • Allergic 1979   • Diverticulosis    • Hypertension    • Low back pain 1990s    [place] [employer] [place] partly due to loss of range of motion of back   • Obesity ~2010     Past Surgical History:   Procedure Laterality Date   • WISDOM TOOTH EXTRACTION         Allergies   Allergen Reactions   • Bee Pollen Unknown - Low Severity       MEDS:  Prior to Admission medications    Medication Sig Start Date End Date Taking?  "Authorizing Provider   amLODIPine (NORVASC) 10 MG tablet Take 1 tablet by mouth Daily. 11/4/22   Michelle Yang APRN   olopatadine ([facility] Olopatadine HCl) 0.1 % ophthalmic solution Administer 1 drop to both eyes 2 (Two) Times a Day. 11/23/22   Michelle Yang APRN         FH:  Family History   Problem Relation Age of Onset   • Cancer Mother         breast   • Early death Mother         cancer   • Stroke Maternal Grandfather        Objective   Vital Signs:  /84 (BP Location: Right arm, Patient Position: Sitting)   Pulse 91   Ht 172.7 cm (67.99\")   Wt 135 kg (298 lb 4.8 oz)   SpO2 95%   BMI 45.37 kg/m²     Class 3 Severe Obesity (BMI >=40). Obesity-related health conditions include the following: obstructive sleep apnea. Obesity is newly identified. BMI is is above average; BMI management plan is completed. We discussed portion control and increasing exercise.        Physical Exam  Vitals reviewed.   Constitutional:       Appearance: Normal appearance.   HENT:      Head: Normocephalic and atraumatic.      Nose: Nose normal.      Mouth/Throat:      Mouth: Mucous membranes are moist.   Cardiovascular:      Rate and Rhythm: Normal rate and regular rhythm.      Heart sounds: No murmur heard.    No friction rub. No gallop.   Pulmonary:      Effort: Pulmonary effort is normal. No respiratory distress.      Breath sounds: Normal breath sounds. No wheezing or rhonchi.   Neurological:      Mental Status: He is alert and oriented to person, place, and time.   Psychiatric:         Behavior: Behavior normal.         Mallampati Score: III (soft and hard palate and base of uvula visible)      Result Review :              Assessment and Plan  London Ag is a 63 y.o. male who returns for follow-up compliance of PAP therapy.  Patient was able to supply information from his phone yesica.  I was unable to obtain download.  His AHI is elevated between 11/h and 35.8/H.  Overall the patient has felt some " improvement though he continues to wake frequently to use the restroom.  Additionally he reports waking with bags under his eyes.  He had decreased his maximum pressure setting on his device which seem to help a little bit with this and his overall sleep though it appears to have affected his AHI. I will refill the patient's supplies today.  We will proceed with a titration study for further evaluation.  The patient may need BiPAP therapy to adequately treat sleep apnea.    Diagnoses and all orders for this visit:    1. Obstructive sleep apnea, adult (Primary)  -     Polysomnography 4 or More Parameters With CPAP; Future  -     PAP Therapy    2. Morbid (severe) obesity due to excess calories           The patient continues to use and benefit from CPAP therapy.    1. The patient was counseled regarding multimodal approach with healthy nutrition, healthy sleep, regular physical activity, social activities, counseling, and medications. Encouraged to practice lateral sleep position. Avoid alcohol and sedatives close to bedtime.     2.  We will refill supplies x1 year.  Return to clinic 1 year or sooner if symptoms warrant. I have reviewed the results of my evaluation and impression and discussed my recommendations in detail with the patient.           Follow Up  Return for Follow up after study.  Patient was given instructions and counseling regarding his condition or for health maintenance advice. Please see specific information pulled into the AVS if appropriate.       SPENCER Nixon, ACNP-BC  Pulmonology, Critical Care, and Sleep Medicine

## 2023-05-26 ENCOUNTER — OFFICE VISIT (OUTPATIENT)
Dept: SLEEP MEDICINE | Facility: CLINIC | Age: 63
End: 2023-05-26
Payer: OTHER GOVERNMENT

## 2023-05-26 VITALS
HEART RATE: 91 BPM | DIASTOLIC BLOOD PRESSURE: 84 MMHG | WEIGHT: 298.3 LBS | BODY MASS INDEX: 45.21 KG/M2 | SYSTOLIC BLOOD PRESSURE: 126 MMHG | HEIGHT: 68 IN | OXYGEN SATURATION: 95 %

## 2023-05-26 DIAGNOSIS — E66.01 MORBID (SEVERE) OBESITY DUE TO EXCESS CALORIES: ICD-10-CM

## 2023-05-26 DIAGNOSIS — G47.33 OBSTRUCTIVE SLEEP APNEA, ADULT: Primary | ICD-10-CM

## 2023-08-24 ENCOUNTER — HOSPITAL ENCOUNTER (OUTPATIENT)
Dept: SLEEP MEDICINE | Facility: HOSPITAL | Age: 63
End: 2023-08-24
Payer: OTHER GOVERNMENT

## 2023-08-24 DIAGNOSIS — G47.33 OBSTRUCTIVE SLEEP APNEA, ADULT: ICD-10-CM

## 2023-08-24 PROCEDURE — 95811 POLYSOM 6/>YRS CPAP 4/> PARM: CPT

## 2023-10-26 DIAGNOSIS — I10 PRIMARY HYPERTENSION: ICD-10-CM

## 2023-10-26 RX ORDER — AMLODIPINE BESYLATE 10 MG/1
10 TABLET ORAL DAILY
Qty: 90 TABLET | Refills: 3 | Status: SHIPPED | OUTPATIENT
Start: 2023-10-26

## 2023-10-31 DIAGNOSIS — G47.33 OBSTRUCTIVE SLEEP APNEA, ADULT: Primary | ICD-10-CM

## 2023-11-10 ENCOUNTER — OFFICE VISIT (OUTPATIENT)
Dept: INTERNAL MEDICINE | Facility: CLINIC | Age: 63
End: 2023-11-10
Payer: OTHER GOVERNMENT

## 2023-11-10 VITALS
BODY MASS INDEX: 45.31 KG/M2 | OXYGEN SATURATION: 98 % | HEIGHT: 68 IN | DIASTOLIC BLOOD PRESSURE: 82 MMHG | HEART RATE: 93 BPM | SYSTOLIC BLOOD PRESSURE: 132 MMHG | WEIGHT: 299 LBS

## 2023-11-10 DIAGNOSIS — Z00.00 ANNUAL PHYSICAL EXAM: Primary | ICD-10-CM

## 2023-11-10 DIAGNOSIS — E55.9 VITAMIN D DEFICIENCY: ICD-10-CM

## 2023-11-10 DIAGNOSIS — Z12.5 SCREENING FOR PROSTATE CANCER: ICD-10-CM

## 2023-11-10 DIAGNOSIS — H57.9 ITCHY EYES: ICD-10-CM

## 2023-11-10 DIAGNOSIS — R68.82 LOW LIBIDO: ICD-10-CM

## 2023-11-10 DIAGNOSIS — R79.89 LOW TESTOSTERONE IN MALE: ICD-10-CM

## 2023-11-10 DIAGNOSIS — G47.33 OSA ON CPAP: ICD-10-CM

## 2023-11-10 DIAGNOSIS — R73.9 HYPERGLYCEMIA: ICD-10-CM

## 2023-11-10 DIAGNOSIS — Z13.220 SCREENING FOR LIPID DISORDERS: ICD-10-CM

## 2023-11-10 DIAGNOSIS — I10 PRIMARY HYPERTENSION: ICD-10-CM

## 2023-11-10 DIAGNOSIS — E66.01 MORBID OBESITY: ICD-10-CM

## 2023-11-10 RX ORDER — BUPROPION HYDROCHLORIDE 150 MG/1
150 TABLET ORAL DAILY
Qty: 30 TABLET | Refills: 1 | Status: SHIPPED | OUTPATIENT
Start: 2023-11-10

## 2023-11-10 RX ORDER — HYDROCHLOROTHIAZIDE 12.5 MG/1
12.5 TABLET ORAL DAILY
Qty: 90 TABLET | Refills: 1 | Status: SHIPPED | OUTPATIENT
Start: 2023-11-10

## 2023-11-10 RX ORDER — OLOPATADINE HYDROCHLORIDE 1 MG/ML
1 SOLUTION/ DROPS OPHTHALMIC 2 TIMES DAILY
Qty: 5 ML | Refills: 11 | Status: SHIPPED | OUTPATIENT
Start: 2023-11-10 | End: 2023-11-15

## 2023-11-10 NOTE — PROGRESS NOTES
"Chief Complaint   Patient presents with    Annual Exam       London Ag is a 63 y.o. male and is here for a comprehensive physical exam.     HTN: takes amlodipine 10 mg daily as prescribed. Monitors BP intermittently, typically \"borderline\". Systolic typically the number that will get to 140.  Denies chest pain, dyspnea, orthopnea, palpitations, lower extremity edema, confusion, headaches, weakness, visual disturbances.     GREGORY. Wearing CPAP. Recent titration study.  Will receive a BiPAP soon.       History:  Erectile dysfunction:  no.  No libido.  Muscle mass has decreased.    Nocturia: yes, 1-2 x a night.  Less now than it was before he started using CPAP      Health Habits:  Dental Exam. up to date  Eye Exam. up to date, wears glasses  Exercise: 1 times/week.  Current exercise activities include: walking   Diet: healthy well balanced diet.  Do you take any herbs or supplements that were not prescribed by a doctor? Vitamin D, probiotic, calcium and magnesium    Health Maintenance   Topic Date Due    COVID-19 Vaccine (4 - 2023-24 season) 01/30/2024 (Originally 9/1/2023)    INFLUENZA VACCINE  03/31/2024 (Originally 8/1/2023)    COLORECTAL CANCER SCREENING  04/28/2025 (Originally 1960)    BMI FOLLOWUP  05/26/2024    ANNUAL PHYSICAL  11/10/2024    TDAP/TD VACCINES (3 - Td or Tdap) 12/30/2031    HEPATITIS C SCREENING  Completed    ZOSTER VACCINE  Completed    Pneumococcal Vaccine 0-64  Aged Out       PMH, PSH, SocHx, FamHx, Allergies, and Medications: Reviewed and updated in the Visit Navigator.     Allergies   Allergen Reactions    Bee Pollen Unknown - Low Severity     Past Medical History:   Diagnosis Date    Allergic 1979    Diverticulosis     Hypertension     Low back pain 1990s    [place] [employer] disability partly due to loss of range of motion of back    Obesity ~2010     Past Surgical History:   Procedure Laterality Date    WISDOM TOOTH EXTRACTION       Social History     Socioeconomic History    Marital " "status:    Tobacco Use    Smoking status: Former     Packs/day: 1.00     Years: 17.00     Additional pack years: 0.00     Total pack years: 17.00     Types: Cigarettes     Start date: 1980     Quit date: 1997     Years since quittin.2    Smokeless tobacco: Never   Substance and Sexual Activity    Alcohol use: Yes     Comment: once a week on average    Drug use: No    Sexual activity: Not Currently     Partners: Female     Comment: , wife in [place]     Family History   Problem Relation Age of Onset    Cancer Mother         breast    Early death Mother         cancer    Stroke Maternal Grandfather        Review of Systems  Review of Systems   All other systems reviewed and are negative.      Objective   /82   Pulse 93   Ht 172.7 cm (67.99\")   Wt 136 kg (299 lb)   SpO2 98%   BMI 45.47 kg/m²   Body mass index is 45.47 kg/m².    Physical Exam  Constitutional:       Appearance: He is well-developed. He is morbidly obese. He is not ill-appearing.   HENT:      Head: Normocephalic.      Right Ear: Tympanic membrane, ear canal and external ear normal.      Left Ear: Tympanic membrane, ear canal and external ear normal.      Nose: Nose normal.      Mouth/Throat:      Mouth: Mucous membranes are moist.      Pharynx: Oropharynx is clear. Uvula midline.   Eyes:      Extraocular Movements: Extraocular movements intact.      Conjunctiva/sclera: Conjunctivae normal.      Pupils: Pupils are equal, round, and reactive to light.   Neck:      Thyroid: No thyromegaly.      Vascular: No carotid bruit.   Cardiovascular:      Rate and Rhythm: Normal rate and regular rhythm.      Pulses:           Radial pulses are 2+ on the right side and 2+ on the left side.        Dorsalis pedis pulses are 2+ on the right side and 2+ on the left side.      Heart sounds: Normal heart sounds.   Pulmonary:      Effort: Pulmonary effort is normal.      Breath sounds: Normal breath sounds.   Abdominal:      General: " Bowel sounds are normal.      Palpations: Abdomen is soft.      Tenderness: There is no abdominal tenderness.   Musculoskeletal:         General: No tenderness. Normal range of motion.      Cervical back: Full passive range of motion without pain, normal range of motion and neck supple.      Right lower leg: No edema.      Left lower leg: No edema.   Lymphadenopathy:      Cervical: No cervical adenopathy.   Skin:     General: Skin is warm.      Capillary Refill: Capillary refill takes less than 2 seconds.   Neurological:      Mental Status: He is alert and oriented to person, place, and time.      Sensory: No sensory deficit.      Coordination: Coordination normal.      Gait: Gait normal.      Comments: CN II-XII normal   Psychiatric:         Attention and Perception: Attention normal.         Mood and Affect: Mood and affect normal.         Speech: Speech normal.         Behavior: Behavior normal.         Thought Content: Thought content normal.       The 10-year CVD risk score (FABIOLA'Elysiaino, et al., 2008) is: 20.5%    Values used to calculate the score:      Age: 63 years      Sex: Male      Diabetic: No      Tobacco smoker: No      Systolic Blood Pressure: 132 mmHg      Is BP treated: Yes      HDL Cholesterol: 43 mg/dL      Total Cholesterol: 177 mg/dL    Assessment & Plan   1. Healthy male exam.    2. Patient Counseling: Including but not Limited to the following, when appropriate:  --Nutrition: Stressed importance of moderation in sodium/caffeine intake, saturated fat and cholesterol, caloric balance, sufficient intake of fresh fruits, vegetables, fiber  --Exercise: Stressed the importance of regular exercise.   --Substance Abuse: Discussed cessation/primary prevention of tobacco, alcohol, or other drug use; driving or other dangerous activities under the influence; availability of treatment for abuse, as indicated based on social history.    --Sexuality: Discussed sexually transmitted diseases, partner  selection, use of condoms and contraceptive alternatives.   --Injury prevention: Discussed safety belts, safety helmets, smoke detector, smoking near bedding or upholstery.   --Dental health: Discussed importance of regular tooth brushing, flossing, and dental visits.  --Immunizations reviewed.  --Discussed benefits of colon cancer screening.  3. Discussed the patient's BMI with him.  The BMI is above average; BMI management plan is completed  4. Return in about 5 weeks (around 12/15/2023) for Next scheduled follow up.  5. Age-appropriate Screening Scheduled    Assessment & Plan     Diagnoses and all orders for this visit:    1. Annual physical exam (Primary)    2. Primary hypertension  -     hydroCHLOROthiazide (HYDRODIURIL) 12.5 MG tablet; Take 1 tablet by mouth Daily.  Dispense: 90 tablet; Refill: 1  -     Comprehensive Metabolic Panel        - Follow heart healthy diet.  Keep sodium intake < 1500 mg per day.  Avoid processed & fast foods.          - Exercise as tolerated, with a goal of 30 minutes of moderate exercise most days.         - Take medications as prescribed.    3. GREGORY on CPAP    4. Itchy eyes  -     olopatadine ([employer] Olopatadine HCl) 0.1 % ophthalmic solution; Administer 1 drop to both eyes 2 (Two) Times a Day.  Dispense: 5 mL; Refill: 11    5. Low libido  -     Testosterone, Free, Total    6. Morbid obesity  -     buPROPion XL (Wellbutrin XL) 150 MG 24 hr tablet; Take 1 tablet by mouth Daily.  Dispense: 30 tablet; Refill: 1        -   Class 3 Severe Obesity (BMI >=40). Obesity-related health conditions include the following: obstructive sleep apnea, hypertension, and dyslipidemias. Obesity is unchanged. BMI is is above average; BMI management plan is completed. We discussed low calorie, low carb based diet program, portion control, increasing exercise, and pharmacologic options including contrave, wegovy, phentermine .     7. Hyperglycemia  -     Hemoglobin A1c    8. Screening for prostate cancer  -      PSA Screen    9. Vitamin D deficiency  -     Vitamin D,25-Hydroxy    10. Screening for lipid disorders  -     Lipid Panel    11. Low testosterone in male  -     Ambulatory Referral to Urology

## 2023-11-15 DIAGNOSIS — H57.9 ITCHY EYES: ICD-10-CM

## 2023-11-15 RX ORDER — OLOPATADINE HYDROCHLORIDE 1 MG/ML
1 SOLUTION/ DROPS OPHTHALMIC 2 TIMES DAILY
Qty: 5 ML | Refills: 11 | Status: SHIPPED | OUTPATIENT
Start: 2023-11-15

## 2023-11-17 LAB
25(OH)D3+25(OH)D2 SERPL-MCNC: 60.5 NG/ML (ref 30–100)
ALBUMIN SERPL-MCNC: 4.7 G/DL (ref 3.5–5.2)
ALBUMIN/GLOB SERPL: 2.4 G/DL
ALP SERPL-CCNC: 65 U/L (ref 39–117)
ALT SERPL-CCNC: 39 U/L (ref 1–41)
AST SERPL-CCNC: 17 U/L (ref 1–40)
BILIRUB SERPL-MCNC: 0.3 MG/DL (ref 0–1.2)
BUN SERPL-MCNC: 19 MG/DL (ref 8–23)
BUN/CREAT SERPL: 20.7 (ref 7–25)
CALCIUM SERPL-MCNC: 9.5 MG/DL (ref 8.6–10.5)
CHLORIDE SERPL-SCNC: 100 MMOL/L (ref 98–107)
CHOLEST SERPL-MCNC: 177 MG/DL (ref 0–200)
CO2 SERPL-SCNC: 27.3 MMOL/L (ref 22–29)
CREAT SERPL-MCNC: 0.92 MG/DL (ref 0.76–1.27)
EGFRCR SERPLBLD CKD-EPI 2021: 94.6 ML/MIN/1.73
GLOBULIN SER CALC-MCNC: 2 GM/DL
GLUCOSE SERPL-MCNC: 104 MG/DL (ref 65–99)
HBA1C MFR BLD: 5.6 % (ref 4.8–5.6)
HDLC SERPL-MCNC: 43 MG/DL (ref 40–60)
LDLC SERPL CALC-MCNC: 120 MG/DL (ref 0–100)
POTASSIUM SERPL-SCNC: 4 MMOL/L (ref 3.5–5.2)
PROT SERPL-MCNC: 6.7 G/DL (ref 6–8.5)
PSA SERPL-MCNC: 2.62 NG/ML (ref 0–4)
SODIUM SERPL-SCNC: 140 MMOL/L (ref 136–145)
TESTOST FREE SERPL-MCNC: 15.9 PG/ML (ref 6.6–18.1)
TESTOST SERPL-MCNC: 188 NG/DL (ref 264–916)
TRIGL SERPL-MCNC: 74 MG/DL (ref 0–150)
VLDLC SERPL CALC-MCNC: 14 MG/DL (ref 5–40)

## 2023-12-22 ENCOUNTER — OFFICE VISIT (OUTPATIENT)
Dept: INTERNAL MEDICINE | Facility: CLINIC | Age: 63
End: 2023-12-22
Payer: OTHER GOVERNMENT

## 2023-12-22 VITALS
BODY MASS INDEX: 45.16 KG/M2 | SYSTOLIC BLOOD PRESSURE: 142 MMHG | OXYGEN SATURATION: 96 % | HEIGHT: 68 IN | HEART RATE: 91 BPM | TEMPERATURE: 98.2 F | DIASTOLIC BLOOD PRESSURE: 86 MMHG | WEIGHT: 298 LBS

## 2023-12-22 DIAGNOSIS — I10 PRIMARY HYPERTENSION: Primary | ICD-10-CM

## 2023-12-22 DIAGNOSIS — G47.33 OSA TREATED WITH BIPAP: ICD-10-CM

## 2023-12-22 PROCEDURE — 99214 OFFICE O/P EST MOD 30 MIN: CPT | Performed by: NURSE PRACTITIONER

## 2023-12-22 RX ORDER — LOSARTAN POTASSIUM 25 MG/1
25 TABLET ORAL DAILY
Qty: 30 TABLET | Refills: 1 | Status: SHIPPED | OUTPATIENT
Start: 2023-12-22

## 2023-12-22 NOTE — PROGRESS NOTES
Office Visit      Patient Name: London Ag  : 1960   MRN: [id number]     Chief Complaint:    Chief Complaint   Patient presents with    Hypertension    morbid obesity       History of Present Illness: London Ag is a 63 y.o. male who is here today with multiple medical concerns.    HTN: He had to stop taking HCTZ. After he started it, he had joint pain and blurry vision. He stopped taking hctz and the joint pain went away, but not the blurry vision. He waited about another week and stopped taking amlodipine, which he thinks was helping a little because blood pressure better while taking it. It was still hovering in the mid 120s over low 80s. He was seeing some of the high readings in the evenings more than earlier in the day. He has tried lisinopril in the past, but it did not work. He denies any chest pain. He still has mild ankle swelling late in the day, but it does not seem to be as bad. He has noticed that when his blood pressure is higher, the pulse rate is lower and vice versa.    He has lost some weight. He has been doing intermittent fasting. He does not buy any snacks. He does not think decreasing his hunger is his issue, feels he just need to be more intentional with diet. He is more conscious of his eating.    His sleep study recently switched him from a CPAP to a BiPAP. He is sleeping 1000 times better. He has an appointment with the urologist on 2024 or 2024 because of his testosterone levels.      Subjective      I have reviewed and the following portions of the patient's history were updated as appropriate: past family history, past medical history, past social history, past surgical history and problem list.      Current Outpatient Medications:     amLODIPine (NORVASC) 10 MG tablet, TAKE 1 TABLET BY MOUTH DAILY, Disp: 90 tablet, Rfl: 3    olopatadine (PATANOL) 0.1 % ophthalmic solution, INSTILL 1 DROP IN BOTH EYES TWICE DAILY, Disp: 5 mL, Rfl: 11    losartan  "(Cozaar) 25 MG tablet, Take 1 tablet by mouth Daily., Disp: 30 tablet, Rfl: 1    testosterone (AndroGel) 50 MG/5GM (1%) gel gel, Place 50 mg on the skin as directed by provider Daily for 30 days. Please request refill with last week of gel, Disp: 30 each, Rfl: 0    Allergies   Allergen Reactions    Wellbutrin [Bupropion] Myalgia    Bee Pollen Unknown - Low Severity    Hydrochlorothiazide Other (See Comments)     Vision changes        Objective     Physical Exam:  Vital Signs:   Vitals:    12/22/23 1028   BP: 142/86   Pulse: 91   Temp: 98.2 °F (36.8 °C)   SpO2: 96%   Weight: 135 kg (298 lb)   Height: 172.7 cm (67.99\")     Body mass index is 45.32 kg/m².         Physical Exam  Constitutional:       Appearance: He is obese. He is not ill-appearing.   HENT:      Head: Normocephalic.      Right Ear: External ear normal.      Left Ear: External ear normal.   Eyes:      Conjunctiva/sclera: Conjunctivae normal.      Pupils: Pupils are equal, round, and reactive to light.   Cardiovascular:      Rate and Rhythm: Normal rate and regular rhythm.      Pulses:           Radial pulses are 2+ on the right side and 2+ on the left side.        Dorsalis pedis pulses are 2+ on the right side and 2+ on the left side.      Heart sounds: Normal heart sounds.   Pulmonary:      Effort: Pulmonary effort is normal.      Breath sounds: Normal breath sounds.   Musculoskeletal:      Cervical back: Normal range of motion and neck supple.   Skin:     General: Skin is warm.      Capillary Refill: Capillary refill takes less than 2 seconds.   Neurological:      Mental Status: He is alert and oriented to person, place, and time.      Coordination: Coordination normal.      Gait: Gait normal.   Psychiatric:         Attention and Perception: Attention normal.         Mood and Affect: Mood and affect normal.         Speech: Speech normal.         Behavior: Behavior normal.             Assessment / Plan      Assessment/Plan:   Diagnoses and all orders " for this visit:    1. Primary hypertension (Primary)  -     losartan (Cozaar) 25 MG tablet; Take 1 tablet by mouth Daily.  Dispense: 30 tablet; Refill: 1  - Continue amlodipine 10 mg daily        - Follow heart healthy diet.  Keep sodium intake < 1500 mg per day.  Avoid processed & fast foods.          - Exercise as tolerated, with a goal of 30 minutes of moderate exercise most days.         - Take medications as prescribed.    2. GREGORY treated with BiPAP        Follow Up:   Return in about 6 weeks (around 2/2/2024) for Next scheduled follow up.    Patient was given instructions and counseling regarding his condition or for health maintenance advice. Please see specific information pulled into the AVS if appropriate.       Primary Care [facility]     Please note that portions of this note may have been completed with a voice recognition program. Efforts were made to edit dictation, but occasionally words are mistranscribed.     Transcribed from ambient dictation for SPENCER Arroyo by Broderick Fonseca.  12/22/23   12:43 EST    Patient or patient representative verbalized consent to the visit recording.  I have personally performed the services described in this document as transcribed by the above individual, and it is both accurate and complete.

## 2024-01-03 ENCOUNTER — LAB (OUTPATIENT)
Dept: LAB | Facility: HOSPITAL | Age: 64
End: 2024-01-03
Payer: OTHER GOVERNMENT

## 2024-01-03 ENCOUNTER — OFFICE VISIT (OUTPATIENT)
Dept: UROLOGY | Facility: CLINIC | Age: 64
End: 2024-01-03
Payer: OTHER GOVERNMENT

## 2024-01-03 VITALS
HEIGHT: 68 IN | WEIGHT: 298 LBS | SYSTOLIC BLOOD PRESSURE: 122 MMHG | BODY MASS INDEX: 45.16 KG/M2 | DIASTOLIC BLOOD PRESSURE: 80 MMHG

## 2024-01-03 DIAGNOSIS — F40.298 NEEDLE PHOBIA: ICD-10-CM

## 2024-01-03 DIAGNOSIS — E29.1 HYPOGONADISM IN MALE: Primary | ICD-10-CM

## 2024-01-03 LAB
ESTRADIOL SERPL HS-MCNC: 17.6 PG/ML
HCT VFR BLD AUTO: 42.7 % (ref 37.5–51)
HGB BLD-MCNC: 14.4 G/DL (ref 13–17.7)
LH SERPL-ACNC: 3.79 MIU/ML

## 2024-01-03 PROCEDURE — 85014 HEMATOCRIT: CPT | Performed by: NURSE PRACTITIONER

## 2024-01-03 PROCEDURE — 36415 COLL VENOUS BLD VENIPUNCTURE: CPT | Performed by: NURSE PRACTITIONER

## 2024-01-03 PROCEDURE — 82670 ASSAY OF TOTAL ESTRADIOL: CPT | Performed by: NURSE PRACTITIONER

## 2024-01-03 PROCEDURE — 85018 HEMOGLOBIN: CPT | Performed by: NURSE PRACTITIONER

## 2024-01-03 PROCEDURE — 83002 ASSAY OF GONADOTROPIN (LH): CPT | Performed by: NURSE PRACTITIONER

## 2024-01-03 PROCEDURE — 99214 OFFICE O/P EST MOD 30 MIN: CPT | Performed by: NURSE PRACTITIONER

## 2024-01-03 PROCEDURE — 84403 ASSAY OF TOTAL TESTOSTERONE: CPT | Performed by: NURSE PRACTITIONER

## 2024-01-03 PROCEDURE — 84402 ASSAY OF FREE TESTOSTERONE: CPT | Performed by: NURSE PRACTITIONER

## 2024-01-03 RX ORDER — TESTOSTERONE GEL, 1% 10 MG/G
50 GEL TRANSDERMAL DAILY
Qty: 30 EACH | Refills: 0 | Status: SHIPPED | OUTPATIENT
Start: 2024-01-03 | End: 2024-02-02

## 2024-01-03 NOTE — PROGRESS NOTES
Office Visit Low Testosterone      Patient Name: London Ag  : 1960   MRN: [id number]     Chief Complaint:   Chief Complaint   Patient presents with    Low testosterone       Referring Provider: Michelle Yang, *    History of Present Illness: London Ag is a 63 y.o. male who presents today with low testosterone.  The serum test was collected due to the following complaints: low energy, and metabolism.    Low libido   yes  Low energy   yes  Poor sleep   yes  Mental clarity issues  yes    History of depression? no  Erectile dysfunction?  no  History of Blood clots?            No  History of Stroke?                   No    Any potential interest in conception?  no    Objective      Review of System:   As noted in HPI    Past Medical History:   Past Medical History:   Diagnosis Date    Allergic     Diverticulosis     Hypertension     Low back pain     [place] disability partly due to loss of range of motion of back    Obesity ~       Past Surgical History:   Past Surgical History:   Procedure Laterality Date    WISDOM TOOTH EXTRACTION         Family History:   Family History   Problem Relation Age of Onset    Cancer Mother         breast    Early death Mother         cancer    Stroke Maternal Grandfather        Social History:   Social History     Socioeconomic History    Marital status:    Tobacco Use    Smoking status: Former     Packs/day: 1.00     Years: 17.00     Additional pack years: 0.00     Total pack years: 17.00     Types: Cigarettes     Start date: 1980     Quit date: 1997     Years since quittin.3    Smokeless tobacco: Never   Substance and Sexual Activity    Alcohol use: Yes     Comment: once a week on average    Drug use: No    Sexual activity: Not Currently     Partners: Female     Comment: , wife in [place]       Medications:     Current Outpatient Medications:     amLODIPine (NORVASC) 10 MG tablet, TAKE 1 TABLET BY MOUTH DAILY,  "Disp: 90 tablet, Rfl: 3    losartan (Cozaar) 25 MG tablet, Take 1 tablet by mouth Daily., Disp: 30 tablet, Rfl: 1    olopatadine (PATANOL) 0.1 % ophthalmic solution, INSTILL 1 DROP IN BOTH EYES TWICE DAILY, Disp: 5 mL, Rfl: 11    Allergies:   Allergies   Allergen Reactions    Wellbutrin [Bupropion] Myalgia    Bee Pollen Unknown - Low Severity    Hydrochlorothiazide Other (See Comments)     Vision changes        Objective     Physical Exam:   Vital Signs:   Vitals:    01/03/24 0951   BP: 122/80   BP Location: Left arm   Patient Position: Sitting   Cuff Size: Adult   Weight: 135 kg (298 lb)   Height: 172.7 cm (67.99\")     Body mass index is 45.32 kg/m².     Constitutional: NAD, WDWN. Obecity  Neurological: A + O x 3    Psych: normal mood and affect        Labs  No results found for: \"TESTOSTERONE\"    Lab Results   Component Value Date    PSA 2.620 11/14/2023    PSA 3.600 11/07/2022    PSA 1.100 12/12/2016       Lab Results   Component Value Date    WBC 7.06 11/07/2022    HGB 14.9 11/07/2022    HCT 42.6 11/07/2022    MCV 92.8 11/07/2022     11/07/2022       Assessment / Plan    Assessment  Mr. Ag is a 63 y.o. male with low testosterone.  Today we discussed the role testosterone plays for a male patient. I have indicated that low testosterone can be associated with metabolic syndrome, erectile dysfunction, coronary artery disease, diabetes, depression, osteoporosis, fatigue, low sex drive, poor sleep, high cholesterol, increased abdominal fat, muscle loss, irritability, hot flashes, and inability to concentrate.     Treatment options were discussed including SERMs, aromatase inhibitors, topical gel or patch, oral troches, nasal spray, testosterone injections every 2-3 weeks, long-acting injections every 10 weeks, and testosterone pellets placed in clinic every 4-6 months.    We discussed testosterone therapy is a life long therapy, we discussed the decreased fertility risk with proceeding with " testosterone    I explained the risks, benefits, and alternatives to testosterone therapies. I informed him of the potential SEs of chest and leg swelling, increased acne, change in mood, polycythemia, and worsening of undiagnosed prostate cancer.     Plan  1.  He wishes to proceed with a trial of daily testosterone gel.  Manual Luis Manuel reviewed  2.  Obtain TT, free T, estradiol, LH, hematocrit and hemoglobin today  3.  Obtain TT, hematocrit and hemoglobin 3 months    Follow Up:   Return in about 3 months (around 4/3/2024) for Follow up SPENCER Davis, NP-C  [facility] [place]

## 2024-01-10 ENCOUNTER — OFFICE VISIT (OUTPATIENT)
Dept: SLEEP MEDICINE | Facility: HOSPITAL | Age: 64
End: 2024-01-10
Payer: OTHER GOVERNMENT

## 2024-01-10 VITALS
SYSTOLIC BLOOD PRESSURE: 135 MMHG | DIASTOLIC BLOOD PRESSURE: 70 MMHG | WEIGHT: 298.8 LBS | OXYGEN SATURATION: 94 % | BODY MASS INDEX: 45.28 KG/M2 | HEIGHT: 68 IN | HEART RATE: 75 BPM

## 2024-01-10 DIAGNOSIS — G47.33 OBSTRUCTIVE SLEEP APNEA, ADULT: Primary | ICD-10-CM

## 2024-01-10 DIAGNOSIS — E66.01 MORBID (SEVERE) OBESITY DUE TO EXCESS CALORIES: ICD-10-CM

## 2024-01-10 PROCEDURE — 99213 OFFICE O/P EST LOW 20 MIN: CPT | Performed by: NURSE PRACTITIONER

## 2024-01-10 NOTE — PROGRESS NOTES
Sleep Clinic Follow Up Note    Chief Complaint  Follow-up    Subjective     History of Present Illness (from previous encounter on 5/26/2023):  London Ag is a 63 y.o. male who returns for follow-up compliance of PAP therapy.  Patient was able to supply information from his phone yesica.  I was unable to obtain download.  His AHI is elevated between 11/h and 35.8/H.  Overall the patient has felt some improvement though he continues to wake frequently to use the restroom.  Additionally he reports waking with bags under his eyes.  He had decreased his maximum pressure setting on his device which seem to help a little bit with this and his overall sleep though it appears to have affected his AHI. I will refill the patient's supplies today.  We will proceed with a titration study for further evaluation.  The patient may need BiPAP therapy to adequately treat sleep apnea. (End copied text).    -In lab titration study was obtained on 8/25/2023 revealing severe obstructive sleep apnea partially treated with BiPAP, nocturnal hypoxemia treated with BiPAP and recommendation for BiPAP with max IPAP of 25, min EPAP 15,    Interval History:  London Ag is a 63 y.o. male returns for follow up and compliance of PAP therapy. The patient was last seen on 5/26/2023. Overall the patient feels good with regard to therapy. The device appears to be working appropriately. On average the patient sleeps 6-7 hours per night. The patient wakes 1 time per night. He feels much improved with bipap.    The patient reports the following changes to their medical and medication history since they were last seen:  Started testosterone gel      Further details are as follows:      Camden Scale is (out of 24): Total score: 7     Weight:  Current Weight: 298 lb    Weight change in the last year:  gain: 0 lbs    The patient's relevant past medical, surgical, family, and social history reviewed and updated in Epic as appropriate.    PMH:    Past  "Medical History:   Diagnosis Date    Allergic 1979    Diverticulosis     Hypertension     Low back pain 1990s    [place] [employer] disability partly due to loss of range of motion of back    Obesity ~2010     Past Surgical History:   Procedure Laterality Date    WISDOM TOOTH EXTRACTION         Allergies   Allergen Reactions    Wellbutrin [Bupropion] Myalgia    Bee Pollen Unknown - Low Severity    Hydrochlorothiazide Other (See Comments)     Vision changes        MEDS:  Prior to Admission medications    Medication Sig Start Date End Date Taking? Authorizing Provider   amLODIPine (NORVASC) 10 MG tablet TAKE 1 TABLET BY MOUTH DAILY 10/26/23   Michelle Yang APRN   losartan (Cozaar) 25 MG tablet Take 1 tablet by mouth Daily. 12/22/23   Michelle Yang APRN   olopatadine (PATANOL) 0.1 % ophthalmic solution INSTILL 1 DROP IN BOTH EYES TWICE DAILY 11/15/23   Michelle Yang APRN   testosterone (AndroGel) 50 MG/5GM (1%) gel gel Place 50 mg on the skin as directed by provider Daily for 30 days. Please request refill with last week of gel 1/3/24 2/2/24  Rebekah Bran APRN         FH:  Family History   Problem Relation Age of Onset    Cancer Mother         breast    Early death Mother         cancer    Stroke Maternal Grandfather        Objective   Vital Signs:  /70 (BP Location: Left arm, Patient Position: Sitting)   Pulse 75   Ht 172.7 cm (68\")   Wt 136 kg (298 lb 12.8 oz)   SpO2 94%   BMI 45.43 kg/m²              Physical Exam  Vitals reviewed.   Constitutional:       Appearance: Normal appearance.   HENT:      Head: Normocephalic and atraumatic.      Nose: Nose normal.      Mouth/Throat:      Mouth: Mucous membranes are moist.   Cardiovascular:      Rate and Rhythm: Normal rate and regular rhythm.      Heart sounds: No murmur heard.     No friction rub. No gallop.   Pulmonary:      Effort: Pulmonary effort is normal. No respiratory distress.      Breath sounds: Normal breath sounds. No " wheezing or rhonchi.   Neurological:      Mental Status: He is alert and oriented to person, place, and time.   Psychiatric:         Behavior: Behavior normal.               Result Review :           PAP Report:  AHI: 1.4/h  Days of Usage: 43/43 (100%)  Number of Days Greater than 4 hours: 100%  Average time (days used): 6 hours 24 minutes  95th percentile leaks: 37.7 L/min  Settings: V auto-Max IPAP 25 cm H2O, min EPAP 15 cm H2O, pressure support 6 cm H2O.       Assessment and Plan  London Ag is a 63 y.o. male who returns for follow-up and compliance of PAP therapy.  The Pap report has been reviewed.  Overall usage and compliance are excellent at 100%.  Patient averages 6 hours 24 minutes of therapy on nights used.  Sleep apnea is well-controlled with an AHI of 1.4/H.  I note a moderate leak at 37.7 L/min which may improve with mask change. He has changed to a medium mask. I will refill the patient's supplies, and I have asked them to return for follow-up and compliance in 1 year or sooner should they have further questions or concerns.    Diagnoses and all orders for this visit:    1. Obstructive sleep apnea, adult (Primary)  -     PAP Therapy    2. Morbid (severe) obesity due to excess calories         The patient continues to use and benefit from PAP therapy.    1. The patient was counseled regarding multimodal approach with healthy nutrition, healthy sleep, regular physical activity, social activities, counseling, and medications. Encouraged to practice lateral sleep position. Avoid alcohol and sedatives close to bedtime.     2.  We will refill supplies x1 year.  Return to clinic 1 year or sooner if symptoms warrant. I have reviewed the results of my evaluation and impression and discussed my recommendations in detail with the patient.           Follow Up  Return in about 1 year (around 1/10/2025) for Annual visit.  Patient was given instructions and counseling regarding his condition or for health  maintenance advice. Please see specific information pulled into the AVS if appropriate.       SPENCER Nixon, [facility] Center[facility]  Pulmonology, Critical Care, and Sleep Medicine

## 2024-01-11 LAB
TESTOST FREE SERPL-MCNC: 8.5 PG/ML (ref 6.6–18.1)
TESTOST SERPL-MCNC: 181 NG/DL (ref 264–916)

## 2024-01-23 DIAGNOSIS — I10 PRIMARY HYPERTENSION: ICD-10-CM

## 2024-01-23 RX ORDER — LOSARTAN POTASSIUM 25 MG/1
25 TABLET ORAL DAILY
Qty: 90 TABLET | Refills: 1 | Status: SHIPPED | OUTPATIENT
Start: 2024-01-23

## 2024-02-02 ENCOUNTER — OFFICE VISIT (OUTPATIENT)
Dept: INTERNAL MEDICINE | Facility: CLINIC | Age: 64
End: 2024-02-02
Payer: OTHER GOVERNMENT

## 2024-02-02 VITALS
TEMPERATURE: 98.5 F | HEIGHT: 68 IN | WEIGHT: 301 LBS | BODY MASS INDEX: 45.62 KG/M2 | OXYGEN SATURATION: 94 % | SYSTOLIC BLOOD PRESSURE: 140 MMHG | HEART RATE: 113 BPM | DIASTOLIC BLOOD PRESSURE: 78 MMHG

## 2024-02-02 DIAGNOSIS — I10 PRIMARY HYPERTENSION: ICD-10-CM

## 2024-02-02 PROCEDURE — 99213 OFFICE O/P EST LOW 20 MIN: CPT | Performed by: NURSE PRACTITIONER

## 2024-02-02 RX ORDER — LOSARTAN POTASSIUM 50 MG/1
50 TABLET ORAL DAILY
Qty: 90 TABLET | Refills: 1 | Status: SHIPPED | OUTPATIENT
Start: 2024-02-02

## 2024-02-08 DIAGNOSIS — E29.1 HYPOGONADISM IN MALE: ICD-10-CM

## 2024-02-08 RX ORDER — TESTOSTERONE GEL, 1% 10 MG/G
50 GEL TRANSDERMAL DAILY
Qty: 30 EACH | Refills: 0 | Status: SHIPPED | OUTPATIENT
Start: 2024-02-08 | End: 2024-03-09

## 2024-03-06 DIAGNOSIS — E29.1 HYPOGONADISM IN MALE: ICD-10-CM

## 2024-03-06 RX ORDER — TESTOSTERONE GEL, 1% 10 MG/G
50 GEL TRANSDERMAL DAILY
Qty: 30 EACH | Refills: 0 | Status: SHIPPED | OUTPATIENT
Start: 2024-03-06 | End: 2024-04-05

## 2024-05-09 DIAGNOSIS — I10 PRIMARY HYPERTENSION: ICD-10-CM

## 2024-05-09 RX ORDER — LOSARTAN POTASSIUM 50 MG/1
50 TABLET ORAL DAILY
Qty: 90 TABLET | Refills: 1 | Status: SHIPPED | OUTPATIENT
Start: 2024-05-09

## 2024-07-25 DIAGNOSIS — I10 PRIMARY HYPERTENSION: ICD-10-CM

## 2024-07-25 RX ORDER — AMLODIPINE BESYLATE 10 MG/1
10 TABLET ORAL DAILY
Qty: 90 TABLET | Refills: 0 | Status: SHIPPED | OUTPATIENT
Start: 2024-07-25

## 2024-11-15 ENCOUNTER — OFFICE VISIT (OUTPATIENT)
Dept: INTERNAL MEDICINE | Facility: CLINIC | Age: 64
End: 2024-11-15
Payer: OTHER GOVERNMENT

## 2024-11-15 VITALS
BODY MASS INDEX: 45.92 KG/M2 | DIASTOLIC BLOOD PRESSURE: 78 MMHG | OXYGEN SATURATION: 97 % | SYSTOLIC BLOOD PRESSURE: 136 MMHG | WEIGHT: 303 LBS | HEIGHT: 68 IN | HEART RATE: 94 BPM | TEMPERATURE: 98.2 F

## 2024-11-15 DIAGNOSIS — Z12.5 SCREENING FOR PROSTATE CANCER: ICD-10-CM

## 2024-11-15 DIAGNOSIS — E66.01 MORBID OBESITY: ICD-10-CM

## 2024-11-15 DIAGNOSIS — Z13.220 SCREENING FOR LIPID DISORDERS: ICD-10-CM

## 2024-11-15 DIAGNOSIS — E55.9 VITAMIN D DEFICIENCY: ICD-10-CM

## 2024-11-15 DIAGNOSIS — G47.33 OSA TREATED WITH BIPAP: ICD-10-CM

## 2024-11-15 DIAGNOSIS — Z00.00 ANNUAL PHYSICAL EXAM: Primary | ICD-10-CM

## 2024-11-15 DIAGNOSIS — M25.611 DECREASED ROM OF RIGHT SHOULDER: ICD-10-CM

## 2024-11-15 DIAGNOSIS — R09.89 LEFT CAROTID BRUIT: ICD-10-CM

## 2024-11-15 DIAGNOSIS — I10 PRIMARY HYPERTENSION: ICD-10-CM

## 2024-11-15 DIAGNOSIS — L98.9 LEG SKIN LESION, LEFT: ICD-10-CM

## 2024-11-15 DIAGNOSIS — M25.511 ACUTE PAIN OF RIGHT SHOULDER: ICD-10-CM

## 2024-11-15 RX ORDER — LOSARTAN POTASSIUM 50 MG/1
50 TABLET ORAL DAILY
Qty: 90 TABLET | Refills: 1 | Status: SHIPPED | OUTPATIENT
Start: 2024-11-15

## 2024-11-15 RX ORDER — AMLODIPINE BESYLATE 10 MG/1
10 TABLET ORAL DAILY
Qty: 90 TABLET | Refills: 1 | Status: SHIPPED | OUTPATIENT
Start: 2024-11-15

## 2024-11-15 NOTE — PROGRESS NOTES
Chief Complaint   Patient presents with   • Annual Exam       London Ag is a 64 y.o. male and is here for a comprehensive physical exam.     HTN.  Taking losartan, amlodipine as prescribed.  Denies chest pain, dyspnea, orthopnea, palpitations, lower extremity edema, confusion, headaches, weakness, visual disturbances.    GREGORY. Wearing BiPap. Gets good rest. Bags under eyes since switching from CPAP and BiPap.      Recent right shoulder pain. Shoulder started popping more than normal.  In the past couple of months, pain started with activity, ROM, particularly overhead movement. Improves with popping shoulder.  Becomes uncomfortable with lifting, will transfer items to left arm at times.  Switched type of mouse used at work, felt it may have helped some.      Wound on left shin. No itching, bleeding.  Noticed scab about a year ago on left shin.  No injury, pain.  Went away, then noticed scab in that area again. Has healed to dark area of skin.       History:  Not currently sexually active. Does not see need for STI screening.   Nocturia:  occasional  No FH of prostate or colon cancer. Cologuard in 4/2022, negative.  No LUTS.  PSA screening normal last year.     Do you take any herbs or supplements that were not prescribed by a doctor? yes, fish oil, vitamin D, magnesium calcium supplement     Health Habits:  Dental Exam. not up to date - brushes teeth twice daily   Eye Exam. up to date, Wears glasses   Exercise: 1 times/week.  Current exercise activities include: walking  Well balanced.     Health Maintenance   Topic Date Due   • ANNUAL PHYSICAL  11/10/2024   • COLORECTAL CANCER SCREENING  04/28/2025   • COVID-19 Vaccine (4 - 2024-25 season) 02/04/2025 (Originally 9/1/2024)   • INFLUENZA VACCINE  03/31/2025 (Originally 8/1/2024)   • BMI FOLLOWUP  11/15/2025   • TDAP/TD VACCINES (3 - Td or Tdap) 12/30/2031   • HEPATITIS C SCREENING  Completed   • ZOSTER VACCINE  Completed   • Pneumococcal Vaccine 0-64  Aged Out  "      PMH, PSH, SocHx, FamHx, Allergies, and Medications: Reviewed and updated in the Visit Navigator.     Allergies   Allergen Reactions   • Wellbutrin [Bupropion] Myalgia   • Bee Pollen Unknown - Low Severity   • Hydrochlorothiazide Other (See Comments)     Vision changes      Past Medical History:   Diagnosis Date   • Allergic    • Diverticulosis    • Hypertension    • Low back pain     [place] partly due to loss of range of motion of back   • Obesity ~     Past Surgical History:   Procedure Laterality Date   • WISDOM TOOTH EXTRACTION       Social History     Socioeconomic History   • Marital status:    Tobacco Use   • Smoking status: Former     Current packs/day: 0.00     Average packs/day: 1 pack/day for 17.2 years (17.2 ttl pk-yrs)     Types: Cigarettes     Start date: 1980     Quit date: 1997     Years since quittin.2   • Smokeless tobacco: Never   Vaping Use   • Vaping status: Never Used   Substance and Sexual Activity   • Alcohol use: Yes     Comment: once a week on average   • Drug use: No   • Sexual activity: Not Currently     Partners: Female     Comment: , wife in [place]     Family History   Problem Relation Age of Onset   • Cancer Mother         breast   • Early death Mother         cancer   • Stroke Maternal Grandfather        Review of Systems  Review of Systems    Objective   /78   Pulse 94   Temp 98.2 °F (36.8 °C)   Ht 172.7 cm (67.99\")   Wt (!) 137 kg (303 lb)   SpO2 97%   BMI 46.08 kg/m²   Body mass index is 46.08 kg/m².         Physical Exam  Constitutional:       Appearance: He is well-developed. He is morbidly obese. He is not ill-appearing.   HENT:      Head: Normocephalic.      Right Ear: Tympanic membrane, ear canal and external ear normal.      Left Ear: Tympanic membrane, ear canal and external ear normal.      Nose: Nose normal.      Mouth/Throat:      Mouth: Mucous membranes are moist.      Pharynx: Oropharynx is clear. " Uvula midline.   Eyes:      Extraocular Movements: Extraocular movements intact.      Conjunctiva/sclera: Conjunctivae normal.      Pupils: Pupils are equal, round, and reactive to light.      Comments: Bags under eyes   Neck:      Thyroid: No thyromegaly.      Vascular: Carotid bruit (left) present.   Cardiovascular:      Rate and Rhythm: Normal rate and regular rhythm.      Pulses:           Dorsalis pedis pulses are 1+ on the right side and 1+ on the left side.      Heart sounds: Normal heart sounds.   Pulmonary:      Effort: Pulmonary effort is normal.      Breath sounds: Normal breath sounds.   Abdominal:      General: Bowel sounds are normal.      Palpations: Abdomen is soft.      Tenderness: There is no abdominal tenderness.   Musculoskeletal:         General: No tenderness or deformity. Normal range of motion.      Right shoulder: Decreased range of motion. Normal strength.      Cervical back: Full passive range of motion without pain, normal range of motion and neck supple.      Right lower le+      Left lower le+   Lymphadenopathy:      Cervical: No cervical adenopathy.   Skin:     General: Skin is warm.      Capillary Refill: Capillary refill takes less than 2 seconds.      Comments: Hard, thick, scaly dark purple lesion on left shin   Neurological:      Mental Status: He is alert and oriented to person, place, and time.      Sensory: No sensory deficit.      Coordination: Coordination normal.      Gait: Gait normal.      Comments: CN II-XII normal   Psychiatric:         Attention and Perception: Attention normal.         Mood and Affect: Mood and affect normal.         Speech: Speech normal.         Behavior: Behavior normal.         Thought Content: Thought content normal.       Assessment & Plan   1. Healthy male exam.    2. Patient Counseling: Including but not Limited to the following, when appropriate:  --Nutrition: Stressed importance of moderation in sodium/caffeine intake, saturated fat  and cholesterol, caloric balance, sufficient intake of fresh fruits, vegetables, fiber  --Exercise: Stressed the importance of regular exercise.   --Substance Abuse: Discussed cessation/primary prevention of tobacco, alcohol, or other drug use; driving or other dangerous activities under the influence; availability of treatment for abuse, as indicated based on social history.    --Sexuality: Discussed sexually transmitted diseases, partner selection, use of condoms and contraceptive alternatives.   --Injury prevention: Discussed safety belts, safety helmets, smoke detector, smoking near bedding or upholstery.   --Dental health: Discussed importance of regular tooth brushing, flossing, and dental visits.  --Immunizations reviewed.  3. Discussed the patient's BMI with him.  The BMI is above average; BMI management plan is completed  4. Return in about 6 months (around 5/15/2025) for Next scheduled follow up.  5. Age-appropriate Screening Scheduled    Assessment & Plan     Diagnoses and all orders for this visit:    1. Annual physical exam (Primary)    2. Acute pain of right shoulder  -     Ambulatory Referral to Physical Therapy for Evaluation & Treatment    3. Decreased ROM of right shoulder  -     Ambulatory Referral to Physical Therapy for Evaluation & Treatment    4. Primary hypertension  -     Comprehensive Metabolic Panel        - Follow heart healthy diet.  Keep sodium intake < 1500 mg per day.  Avoid processed & fast foods.          - Exercise as tolerated, with a goal of 30 minutes of moderate exercise most days.         - Take medications as prescribed.    5. GREGORY treated with BiPAP       - Continue BiPap     6. Morbid obesity       -     Class 3 Severe Obesity (BMI >=40). Obesity-related health conditions include the following: obstructive sleep apnea, hypertension, dyslipidemias, and osteoarthritis. Obesity is worsening. BMI is is above average; BMI management plan is completed. We discussed low calorie,  low carb based diet program, portion control, increasing exercise, and pharmacologic options including GLP-1's.  Unable to take wellbutrin/contrave (intolerance) or phentermine (HTN) . He prefers to continue to pursue lifestyle modification rather than use medication at this time.     7. Screening for prostate cancer  -     PSA SCREENING    8. Vitamin D deficiency        - Continue taking supplement daily     9. Screening for lipid disorders  -     Lipid Panel    10. Leg skin lesion, left  -     Ambulatory Referral to Dermatology    11. Left carotid bruit  -     US carotid bilateral; Future

## 2024-11-16 LAB
ALBUMIN SERPL-MCNC: 4.2 G/DL (ref 3.5–5.2)
ALBUMIN/GLOB SERPL: 1.6 G/DL
ALP SERPL-CCNC: 67 U/L (ref 39–117)
ALT SERPL-CCNC: 57 U/L (ref 1–41)
AST SERPL-CCNC: 25 U/L (ref 1–40)
BILIRUB SERPL-MCNC: 0.3 MG/DL (ref 0–1.2)
BUN SERPL-MCNC: 19 MG/DL (ref 8–23)
BUN/CREAT SERPL: 23.2 (ref 7–25)
CALCIUM SERPL-MCNC: 9.7 MG/DL (ref 8.6–10.5)
CHLORIDE SERPL-SCNC: 103 MMOL/L (ref 98–107)
CHOLEST SERPL-MCNC: 172 MG/DL (ref 0–200)
CO2 SERPL-SCNC: 28.4 MMOL/L (ref 22–29)
CREAT SERPL-MCNC: 0.82 MG/DL (ref 0.76–1.27)
EGFRCR SERPLBLD CKD-EPI 2021: 99.3 ML/MIN/1.73
GLOBULIN SER CALC-MCNC: 2.6 GM/DL
GLUCOSE SERPL-MCNC: 88 MG/DL (ref 65–99)
HDLC SERPL-MCNC: 41 MG/DL (ref 40–60)
LDLC SERPL CALC-MCNC: 100 MG/DL (ref 0–100)
POTASSIUM SERPL-SCNC: 4.4 MMOL/L (ref 3.5–5.2)
PROT SERPL-MCNC: 6.8 G/DL (ref 6–8.5)
PSA SERPL-MCNC: 3.12 NG/ML (ref 0–4)
SODIUM SERPL-SCNC: 142 MMOL/L (ref 136–145)
TRIGL SERPL-MCNC: 180 MG/DL (ref 0–150)
VLDLC SERPL CALC-MCNC: 31 MG/DL (ref 5–40)

## 2024-11-17 NOTE — PROGRESS NOTES
Triglycerides elevated.  This type of lipid is associated with sugar, carbs.  Abstain from sweets, soda, high fructose corn syrup, refined/processed foods, alcohol.  Be physically active most days of the week with a goal of 30 minutes of moderate physical activity.   Cholesterol in normal range but calculated risk of developing heart disease in the next 10 years is a bit elevated at 13.1%.  Eat a heart healthy diet like the Mediterranean diet in addition to low carb.  Recommend starting a statin daily.  If he prefers to make diet changes, and/or take a supplement called CholestOff before agreeing to take a statin, we can recheck lipids in 3 months to make sure this risk is a bit lower.    ALT, a liver enzyme, is slightly elevated.  Remainder of liver and kidney values are normal. Will continue to monitor.   PSA is slightly elevated compared to last year.  Will monitor annually.  Come to clinic with any difficulty starting or stopping urine stream, dysuria, increase in urinary frequency or urgency.

## 2024-12-05 ENCOUNTER — HOSPITAL ENCOUNTER (OUTPATIENT)
Dept: ULTRASOUND IMAGING | Facility: HOSPITAL | Age: 64
Discharge: HOME | End: 2024-12-05
Admitting: NURSE PRACTITIONER
Payer: OTHER GOVERNMENT

## 2024-12-05 DIAGNOSIS — R09.89 LEFT CAROTID BRUIT: ICD-10-CM

## 2024-12-05 PROCEDURE — 93880 EXTRACRANIAL BILAT STUDY: CPT

## 2024-12-06 NOTE — PROGRESS NOTES
Bilateral carotid US shows mild plaque in right carotid, none in left.  Recommend heart healthy diet and daily physical activity.

## 2024-12-13 ENCOUNTER — TREATMENT (OUTPATIENT)
Dept: PHYSICAL THERAPY | Facility: CLINIC | Age: 64
End: 2024-12-13
Payer: OTHER GOVERNMENT

## 2024-12-13 DIAGNOSIS — G89.29 CHRONIC RIGHT SHOULDER PAIN: ICD-10-CM

## 2024-12-13 DIAGNOSIS — M25.511 CHRONIC RIGHT SHOULDER PAIN: ICD-10-CM

## 2024-12-13 DIAGNOSIS — M77.8 SHOULDER TENDINITIS, RIGHT: Primary | ICD-10-CM

## 2024-12-13 PROCEDURE — 97110 THERAPEUTIC EXERCISES: CPT

## 2024-12-13 PROCEDURE — 97535 SELF CARE MNGMENT TRAINING: CPT

## 2024-12-13 PROCEDURE — 97161 PT EVAL LOW COMPLEX 20 MIN: CPT

## 2024-12-13 NOTE — PROGRESS NOTES
Physical Therapy Initial Evaluation and Plan of Care  [address] [place] [address]      Patient: London Ag   : 1960  Diagnosis/ICD-10 Code:  Shoulder tendinitis, right [M77.8]  Referring practitioner: MANI Arroyo  Date of Initial Visit: 2024  Today's Date: 2024  Patient seen for 1 session         Visit Diagnoses:    ICD-10-CM ICD-9-CM   1. Shoulder tendinitis, right  M77.8 726.10   2. Chronic right shoulder pain  M25.511 719.41    G89.29 338.29         Subjective Questionnaire: QuickDASH: 16      Subjective Evaluation    History of Present Illness  Mechanism of injury: Pt reports around the end of Sept the R shoulder began hurting without specific injury. The shoulder felt like it needed to pop. After popping it felt better but just keeps coming back. Notes pain on the outside of the upper arm.,Thought maybe his mouse was some of the problem so he bought an ergonomic mouse as well as one that can sit on the arm of his chair so he is not having to stretch far for it. Sitting still he does not really feel it. Notes pain is only with movement especially pickng something up. Denies [employer]. 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 Plays drums but not any worse than other activities.       Patient Occupation:  Quality of life: good    Pain  At best pain ratin  At worst pain ratin  Location: R shoulder lateral  Quality: dull ache and sharp  Relieving factors: rest  Aggravating factors: keyboarding, lifting, movement, overhead activity, prolonged positioning and outstretched reach  Progression: improved    Social Support  Lives with: spouse    Hand dominance: right    Diagnostic Tests  No diagnostic tests performed    Treatments  No previous or current treatments  Patient Goals  Patient goals for therapy: decreased pain, increased motion, return to sport/leisure activities and increased strength              Objective          Static Posture     Head  Forward.    Shoulders  Elevated and rounded.    Active Range of Motion   Left Shoulder   Flexion: WFL  Abduction: WFL  Internal rotation 0°: WFL  Internal rotation BTB: WFL    Right Shoulder   Flexion: 165 degrees   Abduction: 175 degrees   External rotation BTH: WFL  Internal rotation BTB: WFL    Additional Active Range of Motion Details        Strength/Myotome Testing     Left Shoulder     Planes of Motion   Flexion: 5   Abduction: 5   External rotation at 0°: 4   Internal rotation at 0°: 4     Isolated Muscles   Biceps: 4+   Triceps: 4     Right Shoulder     Planes of Motion   Flexion: 4+ (pain)   Abduction: 4+   External rotation at 0°: 4 (pain)   Internal rotation at 0°: 4     Isolated Muscles   Biceps: 4+   Triceps: 4     Additional Strength Details  Pain supra R flexion , pain ER  Tricep 4/5 R     Tests     Right Shoulder   Positive empty can and full can.   Negative AC shear, belly press, crossover, drop arm, Hawkin's, painful arc and Yergason's.     Additional Tests Details  Upper cut pt report tension, no pain      General Comments     Shoulder Comments   Pt noted to have improved pain when performing resisted ER when he repositioned scapula and HH.     Pt educated regarding anatomy and pathophysiology of injury. Discussed effect posture has on shoulder pain and trying to reposition if he develops pain with activity.    Assessment & Plan       Assessment  Impairments: abnormal muscle tone, activity intolerance, impaired physical strength and pain with function   Functional limitations: carrying objects, lifting, sleeping, pulling and reaching behind back   Assessment details: Pt is a 63 YO M who presents to the clinic with chronic R shoulder pain. Pt with signs and symptoms consistent with R RTC and biceps tendinitis secondary to postural deficits. Pt will benefit from skilled PT for improving scapular and HH positioning, improving R UE strength and  decreasing R UE pain to aid with return to normal daily activities.  Barriers to therapy: chronic abnormal positioning,age  Prognosis: good    Goals  Plan Goals: SHORT TERM GOALS:     4 weeks  1. Pt I w/ HEP  2. Pt to demonstrate half grade increase in right shoulder strength to improve ability to perform ADL's  3. Pt to demonstrate ability to perform 30 minutes continuous activity in the clinic without increase in pain     LONG TERM GOALS:   8 weeks  1. Pt to demonstrate improved scapular retraction and HH posterior positioning to allow for the ability to perform all necessary functional activities without pain  2. Pt to demonstrate ability to lift 5# on front of him with the right arm without increase in pain  3. Pt to report being able to work full duty and work around the house without increase in pain in the shoulder  4. Pt to tolerate 60 minutes continuous activity in the clinic without increase in pain        Plan  Therapy options: will be seen for skilled therapy services  Planned modality interventions: cryotherapy, thermotherapy (hydrocollator packs) and ultrasound  Planned therapy interventions: abdominal trunk stabilization, body mechanics training, flexibility, functional ROM exercises, home exercise program, joint mobilization, manual therapy, neuromuscular re-education, postural training, soft tissue mobilization, spinal/joint mobilization, strengthening and therapeutic activities  Frequency: 1x week  Duration in weeks: 8  Treatment plan discussed with: patient        History # of Personal Factors and/or Comorbidities: LOW (0)  Examination of Body System(s): # of elements: LOW (1-2)  Clinical Presentation: STABLE   Clinical Decision Making: LOW       Timed:         Manual Therapy:         mins  78156;     Therapeutic Exercise:   15      mins  87864;     Neuromuscular Frankie:       mins  55939;    Therapeutic Activity:          mins  43958;     Gait Training:          mins  13696;     Ultrasound:           mins  38511;    Ionto                                   mins   79313  Self Care                      8      mins   03814  Canalith Repos         mins 56859      Un-Timed:  Electrical Stimulation:         mins  51680 ( );  Dry Needling          mins self-pay  Traction          mins 29425  Low Eval   25      Mins  71910  Mod Eval          Mins  36604  High Eval                            Mins  33367        Timed Treatment:   23   mins   Total Treatment:     48   mins      PT: Michelle Noble PT     License Number: [id number]    Electronically signed by Michelle Noble PT, 12/13/24, 10:40 AM EST    Certification Period: 12/14/2024 thru 3/13/2025  I certify that the therapy services are furnished while this patient is under my care.  The services outlined above are required by this patient, and will be reviewed every 90 days.         Physician Signature:__________________________________________________    PHYSICIAN: Michelle Yang APRN  NPI: [id number]      DATE:     Please sign and return via fax to [email or web address] . Thank you, [facility].

## 2024-12-19 ENCOUNTER — TREATMENT (OUTPATIENT)
Dept: PHYSICAL THERAPY | Facility: CLINIC | Age: 64
End: 2024-12-19
Payer: OTHER GOVERNMENT

## 2024-12-19 DIAGNOSIS — M77.8 SHOULDER TENDINITIS, RIGHT: Primary | ICD-10-CM

## 2024-12-19 DIAGNOSIS — G89.29 CHRONIC RIGHT SHOULDER PAIN: ICD-10-CM

## 2024-12-19 DIAGNOSIS — M25.511 CHRONIC RIGHT SHOULDER PAIN: ICD-10-CM

## 2024-12-19 PROCEDURE — 97140 MANUAL THERAPY 1/> REGIONS: CPT

## 2024-12-19 PROCEDURE — 97110 THERAPEUTIC EXERCISES: CPT

## 2024-12-20 NOTE — PROGRESS NOTES
Physical Therapy Daily Treatment Note  [address] [place] [address]      Patient: London Ag   : 1960  Referring practitioner: SAMAN Arroyo*  Date of Initial Visit: Type: THERAPY  Noted: 2024  Today's Date: 2024  Patient seen for 2 sessions         Visit Diagnoses:    ICD-10-CM ICD-9-CM   1. Shoulder tendinitis, right  M77.8 726.10   2. Chronic right shoulder pain  M25.511 719.41    G89.29 338.29       Subjective   Patient reports he is already noticing some improvements. Reports the pain on the outside of the shoulder is not really sharp anymore, mostly just a dull ache. Able to perform exercises without any problems.    Objective   See Exercise, Manual, and Modality Logs for complete treatment.     Assessment/Plan  Pt without report of increased pain during treatment and with improved supraspinatus pain after performing AIG. Pt's HEP was updated to include AIG and eccentric biceps. Continue with current POT progressing pt per tolerance.    Timed:         Manual Therapy: 12        mins  43142;     Therapeutic Exercise:     20    mins  14613;     Neuromuscular Frankie:      mins  63823;    Therapeutic Activity:          mins  55812;     Gait Training:           mins  62892;     Ultrasound:          mins  91039;    Ionto                                   mins   69490  Self Care                            mins   26457  Canalith Repos         mins 31155      Un-Timed:  Electrical Stimulation:        mins  38961 ( );  Dry Needling          mins self-pay  Traction          mins 62464      Timed Treatment:   32   mins   Total Treatment:     32   mins    Michelle Noble PT  [place] License: [id number]

## 2024-12-27 ENCOUNTER — TREATMENT (OUTPATIENT)
Dept: PHYSICAL THERAPY | Facility: CLINIC | Age: 64
End: 2024-12-27
Payer: OTHER GOVERNMENT

## 2024-12-27 DIAGNOSIS — G89.29 CHRONIC RIGHT SHOULDER PAIN: ICD-10-CM

## 2024-12-27 DIAGNOSIS — M25.511 CHRONIC RIGHT SHOULDER PAIN: ICD-10-CM

## 2024-12-27 DIAGNOSIS — M77.8 SHOULDER TENDINITIS, RIGHT: Primary | ICD-10-CM

## 2024-12-28 NOTE — PROGRESS NOTES
Physical Therapy Daily Treatment Note  [address] [place] [address]      Patient: London Ag   : 1960  Referring practitioner: SAMAN Arroyo*  Date of Initial Visit: Type: THERAPY  Noted: 2024  Today's Date: 2024  Patient seen for 3 sessions         Visit Diagnoses:    ICD-10-CM ICD-9-CM   1. Shoulder tendinitis, right  M77.8 726.10   2. Chronic right shoulder pain  M25.511 719.41    G89.29 338.29       Subjective   Patient reports that the shoulder continues to improve. New exercises are going well. Notes decreased popping at the front of the shoulder.    Objective   See Exercise, Manual, and Modality Logs for complete treatment.   Posture: pt demonstrates improved scapular retraction and HH repositioning posteriorly    Assessment/Plan  Pt has met STG#1,3 and LTG#1,2,3. Remaining goals are still appropriate and pt is making excellent progress toward them. Pt is ready to continue progress with his HEP. Tolerated treatment without report of increased pain. Place pt's chart on hold for 30 days to allow him to assess the efficacy of the HEP. If pt does not call for an appt in 30 days DC chart at that time.    Timed:         Manual Therapy: 12        mins  26076;     Therapeutic Exercise:     16    mins  91172;     Neuromuscular Frankie:      mins  30164;    Therapeutic Activity:          mins  39136;     Gait Training:           mins  74853;     Ultrasound:          mins  41202;    Ionto                                   mins   90442  Self Care                            mins   44822  Canalith Repos         mins 33234      Un-Timed:  Electrical Stimulation:        mins  84412 ( );  Dry Needling          mins self-pay  Traction          mins 36622      Timed Treatment:   28   mins   Total Treatment:     28   mins    Michelle Noble PT  [place] License: [id number]

## 2025-01-14 NOTE — PROGRESS NOTES
Sleep Clinic Follow Up Note    Chief Complaint  Sleeping Problem, Sleep Apnea, and Follow-up    Subjective     History of Present Illness (from previous encounter on 1/10/2024):  London Ag is a 63 y.o. male who returns for follow-up and compliance of PAP therapy.  The Pap report has been reviewed.  Overall usage and compliance are excellent at 100%.  Patient averages 6 hours 24 minutes of therapy on nights used.  Sleep apnea is well-controlled with an AHI of 1.4/H.  I note a moderate leak at 37.7 L/min which may improve with mask change. He has changed to a medium mask. I will refill the patient's supplies, and I have asked them to return for follow-up and compliance in 1 year or sooner should they have further questions or concerns. (End copied text).    Interval History:  London Ag is a 64 y.o. male returns for follow up and compliance of PAP therapy. The patient was last seen on 1/10/2024 by me. Overall the patient feels good with regard to therapy. The device appears to be working appropriately. On average the patient sleeps 6-7 hours per night. The patient wakes 1/3 of the nights per week.     The patient reports the following changes to their medical and medication history since they were last seen:  None    Further details are as follows:      [place] Scale is (out of 24): Total score: 5     Weight:  Current Weight: 304 lb    Weight change in the last year:  gain: 0 lbs    The patient's relevant past medical, surgical, family, and social history reviewed and updated in Epic as appropriate.    PMH:    Past Medical History:   Diagnosis Date    Allergic 1979    Diverticulosis     Hypertension     Low back pain 1990s    [place] due to loss of range of motion of back    Obesity ~2010     Past Surgical History:   Procedure Laterality Date    WISDOM TOOTH EXTRACTION         Allergies   Allergen Reactions    Wellbutrin [Bupropion] Myalgia    Bee Pollen Unknown - Low Severity     "Hydrochlorothiazide Other (See Comments)     Vision changes        MEDS:  Prior to Admission medications    Medication Sig Start Date End Date Taking? Authorizing Provider   amLODIPine (NORVASC) 10 MG tablet Take 1 tablet by mouth Daily. 11/15/24   Michelle Yang APRN   losartan (COZAAR) 50 MG tablet Take 1 tablet by mouth Daily. 11/15/24   Michelle Yang APRN         FH:  Family History   Problem Relation Age of Onset    Cancer Mother         breast    Early death Mother         cancer    Stroke Maternal Grandfather        Objective   Vital Signs:  /78 (BP Location: Left arm, Patient Position: Sitting, Cuff Size: Adult)   Pulse 93   Temp 97.3 °F (36.3 °C) (Temporal)   Ht 172.7 cm (67.99\")   Wt (!) 138 kg (304 lb 14.4 oz)   SpO2 96%   BMI 46.37 kg/m²     Patient's (Body mass index is 46.37 kg/m².) indicates that they are morbidly obese (BMI > 40 or > 35 with obesity - related health condition) with health related conditions that include obstructive sleep apnea . Weight is unchanged. BMI is is above average; BMI management plan is completed. We discussed portion control and increasing exercise.            Physical Exam  Vitals reviewed.   Constitutional:       Appearance: Normal appearance.   HENT:      Head: Normocephalic and atraumatic.      Nose: Nose normal.      Mouth/Throat:      Mouth: Mucous membranes are moist.   Cardiovascular:      Rate and Rhythm: Normal rate and regular rhythm.      Heart sounds: No murmur heard.     No friction rub. No gallop.   Pulmonary:      Effort: Pulmonary effort is normal. No respiratory distress.      Breath sounds: Normal breath sounds. No wheezing or rhonchi.   Neurological:      Mental Status: He is alert and oriented to person, place, and time.   Psychiatric:         Behavior: Behavior normal.               Result Review :           PAP Report:  AHI: 8.8/h  Days of Usage: 30/30 (100%)  Number of Days Greater than 4 hours: 30/30 (100%)  Average " time (days used): 6 hours 59 minutes  95th percentile leaks: 19.1 L/min  Settings: V auto-Max IPAP 22 cm H2O, min EPAP 9 cm H2O, pressure support 4 cm H2O.       Assessment and Plan  London Ag is a 64 y.o. male who returns for follow-up and compliance of PAP therapy.  The Pap report has been reviewed.  Overall usage and compliance are at 100%.  The patient averages 6 hours and 59 minutes of therapy.  Sleep apnea is controlled with an AHI of 8.8/h.  He has a history of severe obstructive sleep apnea with an initial AHI of 73/h.  I will refill the patient's supplies, and I have asked them to return for follow-up and compliance in 1 year or sooner should they have further questions or concerns.    Diagnoses and all orders for this visit:    1. Obstructive sleep apnea, adult (Primary)  -     PAP Therapy    2. Morbid (severe) obesity due to excess calories           The patient continues to use and benefit from PAP therapy.    1. The patient was counseled regarding multimodal approach with healthy nutrition, healthy sleep, regular physical activity, social activities, counseling, and medications. Encouraged to practice lateral sleep position. Avoid alcohol and sedatives close to bedtime.     2.  We will refill supplies x1 year.  Return to clinic 1 year or sooner if symptoms warrant. I have reviewed the results of my evaluation and impression and discussed my recommendations in detail with the patient.           Follow Up  Return in about 1 year (around 1/17/2026) for Annual visit.  Patient was given instructions and counseling regarding his condition or for health maintenance advice. Please see specific information pulled into the AVS if appropriate.       SPENCER Nixon, ACNP-BC  Pulmonology, Critical Care, and Sleep Medicine

## 2025-01-17 ENCOUNTER — OFFICE VISIT (OUTPATIENT)
Dept: SLEEP MEDICINE | Age: 65
End: 2025-01-17
Payer: OTHER GOVERNMENT

## 2025-01-17 VITALS
OXYGEN SATURATION: 96 % | TEMPERATURE: 97.3 F | SYSTOLIC BLOOD PRESSURE: 142 MMHG | DIASTOLIC BLOOD PRESSURE: 78 MMHG | BODY MASS INDEX: 46.21 KG/M2 | HEIGHT: 68 IN | HEART RATE: 93 BPM | WEIGHT: 304.9 LBS

## 2025-01-17 DIAGNOSIS — E66.01 MORBID (SEVERE) OBESITY DUE TO EXCESS CALORIES: ICD-10-CM

## 2025-01-17 DIAGNOSIS — G47.33 OBSTRUCTIVE SLEEP APNEA, ADULT: Primary | ICD-10-CM

## 2025-01-28 DIAGNOSIS — I10 PRIMARY HYPERTENSION: ICD-10-CM

## 2025-01-28 RX ORDER — LOSARTAN POTASSIUM 50 MG/1
50 TABLET ORAL DAILY
Qty: 90 TABLET | Refills: 1 | Status: SHIPPED | OUTPATIENT
Start: 2025-01-28

## 2025-01-28 RX ORDER — AMLODIPINE BESYLATE 10 MG/1
10 TABLET ORAL DAILY
Qty: 90 TABLET | Refills: 1 | Status: SHIPPED | OUTPATIENT
Start: 2025-01-28

## 2025-07-24 DIAGNOSIS — I10 PRIMARY HYPERTENSION: ICD-10-CM

## 2025-07-24 RX ORDER — AMLODIPINE BESYLATE 10 MG/1
10 TABLET ORAL DAILY
Qty: 90 TABLET | Refills: 3 | Status: SHIPPED | OUTPATIENT
Start: 2025-07-24

## 2025-07-24 RX ORDER — LOSARTAN POTASSIUM 50 MG/1
50 TABLET ORAL DAILY
Qty: 90 TABLET | Refills: 3 | Status: SHIPPED | OUTPATIENT
Start: 2025-07-24